# Patient Record
Sex: FEMALE | Race: AMERICAN INDIAN OR ALASKA NATIVE
[De-identification: names, ages, dates, MRNs, and addresses within clinical notes are randomized per-mention and may not be internally consistent; named-entity substitution may affect disease eponyms.]

---

## 2021-06-12 ENCOUNTER — HOSPITAL ENCOUNTER (INPATIENT)
Dept: HOSPITAL 5 - ED | Age: 36
LOS: 7 days | Discharge: HOME | DRG: 871 | End: 2021-06-19
Attending: INTERNAL MEDICINE | Admitting: INTERNAL MEDICINE
Payer: MEDICAID

## 2021-06-12 DIAGNOSIS — Z20.822: ICD-10-CM

## 2021-06-12 DIAGNOSIS — F10.20: ICD-10-CM

## 2021-06-12 DIAGNOSIS — I11.0: ICD-10-CM
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
## 2021-06-12 DIAGNOSIS — E66.9: ICD-10-CM

## 2021-06-12 DIAGNOSIS — I42.0: ICD-10-CM

## 2021-06-12 DIAGNOSIS — A41.9: Primary | ICD-10-CM

## 2021-06-12 DIAGNOSIS — I50.23: ICD-10-CM

## 2021-06-12 DIAGNOSIS — Z71.6: ICD-10-CM

## 2021-06-12 DIAGNOSIS — D50.9: ICD-10-CM

## 2021-06-12 DIAGNOSIS — E87.6: ICD-10-CM

## 2021-06-12 DIAGNOSIS — R79.1: ICD-10-CM

## 2021-06-12 DIAGNOSIS — J18.9: ICD-10-CM

## 2021-06-12 DIAGNOSIS — F17.210: ICD-10-CM

## 2021-06-12 DIAGNOSIS — J96.21: ICD-10-CM

## 2021-06-12 LAB
%HYPO/RBC NFR BLD AUTO: (no result) %
ANISOCYTOSIS BLD QL SMEAR: (no result)
APTT BLD: 25.1 SEC. (ref 24.2–36.6)
BAND NEUTROPHILS # (MANUAL): 0 K/MM3
BUN SERPL-MCNC: 8 MG/DL (ref 7–17)
BUN/CREAT SERPL: 11 %
CALCIUM SERPL-MCNC: 9.1 MG/DL (ref 8.4–10.2)
CRP SERPL-MCNC: 6.5 MG/DL (ref 0–1.3)
HCO3 BLDA-SCNC: 27.6 MMOL/L (ref 20–26)
HCT VFR BLD CALC: 36.1 % (ref 30.3–42.9)
HEMOLYSIS INDEX: 2
HGB BLD-MCNC: 11.5 GM/DL (ref 10.1–14.3)
INR PPP: 1.03 (ref 0.87–1.13)
MCHC RBC AUTO-ENTMCNC: 32 % (ref 30–34)
MCV RBC AUTO: 73 FL (ref 79–97)
MYELOCYTES # (MANUAL): 0 K/MM3
PCO2 BLDA: 41.9 MM HG
PH BLDA: 7.44 PH UNITS (ref 7.35–7.45)
PLATELET # BLD: 324 K/MM3 (ref 140–440)
PO2 BLDA: 196.3 MM HG (ref 80–90)
PROMYELOCYTES # (MANUAL): 0 K/MM3
RBC # BLD AUTO: 4.98 M/MM3 (ref 3.65–5.03)
TOTAL CELLS COUNTED BLD: 100

## 2021-06-12 PROCEDURE — 94660 CPAP INITIATION&MGMT: CPT

## 2021-06-12 PROCEDURE — 93970 EXTREMITY STUDY: CPT

## 2021-06-12 PROCEDURE — 99406 BEHAV CHNG SMOKING 3-10 MIN: CPT

## 2021-06-12 PROCEDURE — 85027 COMPLETE CBC AUTOMATED: CPT

## 2021-06-12 PROCEDURE — 93005 ELECTROCARDIOGRAM TRACING: CPT

## 2021-06-12 PROCEDURE — 83615 LACTATE (LD) (LDH) ENZYME: CPT

## 2021-06-12 PROCEDURE — 87400 INFLUENZA A/B EACH AG IA: CPT

## 2021-06-12 PROCEDURE — 84484 ASSAY OF TROPONIN QUANT: CPT

## 2021-06-12 PROCEDURE — 85025 COMPLETE CBC W/AUTO DIFF WBC: CPT

## 2021-06-12 PROCEDURE — 71275 CT ANGIOGRAPHY CHEST: CPT

## 2021-06-12 PROCEDURE — 82140 ASSAY OF AMMONIA: CPT

## 2021-06-12 PROCEDURE — 71045 X-RAY EXAM CHEST 1 VIEW: CPT

## 2021-06-12 PROCEDURE — 85730 THROMBOPLASTIN TIME PARTIAL: CPT

## 2021-06-12 PROCEDURE — 80053 COMPREHEN METABOLIC PANEL: CPT

## 2021-06-12 PROCEDURE — U0003 INFECTIOUS AGENT DETECTION BY NUCLEIC ACID (DNA OR RNA); SEVERE ACUTE RESPIRATORY SYNDROME CORONAVIRUS 2 (SARS-COV-2) (CORONAVIRUS DISEASE [COVID-19]), AMPLIFIED PROBE TECHNIQUE, MAKING USE OF HIGH THROUGHPUT TECHNOLOGIES AS DESCRIBED BY CMS-2020-01-R: HCPCS

## 2021-06-12 PROCEDURE — 80048 BASIC METABOLIC PNL TOTAL CA: CPT

## 2021-06-12 PROCEDURE — 84145 PROCALCITONIN (PCT): CPT

## 2021-06-12 PROCEDURE — 93306 TTE W/DOPPLER COMPLETE: CPT

## 2021-06-12 PROCEDURE — 82728 ASSAY OF FERRITIN: CPT

## 2021-06-12 PROCEDURE — 85610 PROTHROMBIN TIME: CPT

## 2021-06-12 PROCEDURE — 87040 BLOOD CULTURE FOR BACTERIA: CPT

## 2021-06-12 PROCEDURE — 85379 FIBRIN DEGRADATION QUANT: CPT

## 2021-06-12 PROCEDURE — 82803 BLOOD GASES ANY COMBINATION: CPT

## 2021-06-12 PROCEDURE — 86140 C-REACTIVE PROTEIN: CPT

## 2021-06-12 PROCEDURE — 84703 CHORIONIC GONADOTROPIN ASSAY: CPT

## 2021-06-12 PROCEDURE — 83735 ASSAY OF MAGNESIUM: CPT

## 2021-06-12 PROCEDURE — 36415 COLL VENOUS BLD VENIPUNCTURE: CPT

## 2021-06-12 PROCEDURE — 83880 ASSAY OF NATRIURETIC PEPTIDE: CPT

## 2021-06-12 PROCEDURE — 5A09357 ASSISTANCE WITH RESPIRATORY VENTILATION, LESS THAN 24 CONSECUTIVE HOURS, CONTINUOUS POSITIVE AIRWAY PRESSURE: ICD-10-PCS | Performed by: INTERNAL MEDICINE

## 2021-06-12 PROCEDURE — 80061 LIPID PANEL: CPT

## 2021-06-12 PROCEDURE — 85007 BL SMEAR W/DIFF WBC COUNT: CPT

## 2021-06-12 PROCEDURE — 82947 ASSAY GLUCOSE BLOOD QUANT: CPT

## 2021-06-12 PROCEDURE — 82962 GLUCOSE BLOOD TEST: CPT

## 2021-06-12 NOTE — EMERGENCY DEPARTMENT REPORT
ED Shortness of Breath HPI





- General


Chief Complaint: Dyspnea/Respdistress


Stated Complaint: RESP DISTRESS


Time Seen by Provider: 21 18:24


Source: patient, EMS


Mode of arrival: Stretcher


Limitations: Other





- History of Present Illness


Initial Comments: 





Patient is a 36-year-old F American female past medical history of hypertension 

and congestive heart failure with unknown EF who is presenting with respiratory 

distress.  Patient states last night she became more short of breath.  States 

she has been compliant with her medications.  Patient started having extreme 

respiratory distress this afternoon and paramedics were called.  Patient had O2 

sat in the upper 60s low 70s on their arrival.  Patient tripoding.  Patient 

placed on CPAP and given to nebs of albuterol.  Patient given 0.2 sublingual 

nitroglycerin as well.  She is denying any chest pain recent cough cold 

congestion fevers nausea vomiting or diarrhea at this time.  Patient has had 

several episodes with her congestive heart failure in the past.  Is the 

patient's first visit to our hospital no past medical history can be found in 

her records.








Patient has not been vaccinated for COVID-19





- Related Data


                                    Allergies











Allergy/AdvReac Type Severity Reaction Status Date / Time


 


No Known Allergies Allergy   Unverified 21 18:25














ED Review of Systems


ROS: 


Stated complaint: RESP DISTRESS


Other details as noted in HPI





Comment: All other systems reviewed and negative





ED Past Medical Hx





- Past Medical History


Hx Congestive Heart Failure: Yes





- Social History


Smoking Status: Never Smoker


Substance Use Type: Alcohol





ED Physical Exam





- General


Limitations: Other


General appearance: alert, in no apparent distress





- Head


Head exam: Present: atraumatic, normocephalic





- Eye


Eye exam: Present: normal appearance, PERRL, EOMI





- ENT


ENT exam: Present: mucous membranes moist





- Neck


Neck exam: Present: normal inspection





- Respiratory


Respiratory exam: Present: respiratory distress, wheezes, rales, accessory 

muscle use, decreased breath sounds.  Absent: normal lung sounds bilaterally, 

rhonchi, chest wall tenderness





- Cardiovascular


Cardiovascular Exam: Present: regular rate, normal rhythm, normal heart sounds. 

Absent: systolic murmur, diastolic murmur, rubs, gallop





- GI/Abdominal


GI/Abdominal exam: Present: soft, normal bowel sounds.  Absent: distended, 

tenderness, guarding, rebound





- Extremities Exam


Extremities exam: Present: normal inspection





- Back Exam


Back exam: Present: normal inspection





- Neurological Exam


Neurological exam: Present: alert, oriented X3





- Psychiatric


Psychiatric exam: Present: normal affect, normal mood





- Skin


Skin exam: Present: warm, dry, intact, normal color.  Absent: rash





ED Course


                                   Vital Signs











  21





  18:44 18:54 20:24


 


Temperature 97.6 F  


 


Pulse Rate 126 H 129 H 122 H


 


Respiratory 18  48 H





Rate   


 


Blood Pressure  117/75 139/85


 


Blood Pressure 133/75  





[l]   


 


O2 Sat by Pulse 100  100





Oximetry   














  21





  21:00


 


Temperature 


 


Pulse Rate 117 H


 


Respiratory 37 H





Rate 


 


Blood Pressure 


 


Blood Pressure 130/86





[l] 


 


O2 Sat by Pulse 100





Oximetry 














- Reevaluation(s)


Reevaluation #1: 





21 20:24


Patient's x-ray shows cardiomegaly with pulmonary vascular congestion and 

infiltrate that was interpreted as possible pneumonia in the right middle lobe. 

 Left side is obscured by the cardiac silhouette.  Patient does have a white 

count of 16 but she also has an elevated BNP.  At this point the differential 

includes congestive heart failure exacerbation which is what the patient 

believes she was presenting with but also includes pneumonia from a bacterial 

source as well as COVID-19.  Pulmonary embolus is also in the differential as 

well.  Because the chest x-ray is nonconclusive and the patient was extremely 

hypoxic before placed on supplemental oxygen CT of the chest will be performed. 

 CT angio to rule out pulmonary embolus and will also be able to better 

delineate the lung markings present on the chest x-ray.  Patient is doing well 

on BiPAP at this time.  We have added antibiotics blood cultures and lactic to 

the laboratory studies.  Patient given Rocephin and azithromycin.  Patient is 

diuresing after Lasix was given.


21 20:26








ED Medical Decision Making





- Lab Data


Result diagrams: 


                                 21 18:45





                                 21 20:16





- EKG Data


-: EKG Interpreted by Me





- EKG Data





21 18:47


EKG shows sinus rhythm with a rate of 130.  Possible Q waves in anterior leads. 

 No ST segment elevations or depressions.  Nonspecific T wave flattening.  Time 

interpretation 3435





- Radiology Data





[facility]  


                                     [address] [place] [address]  


 


                                            XRay Report   


                                               Signed  


 


Patient: JOLENE PATRICK                                                      

          MR#:   


[id number]          


: 1985                                                                

Acct:[id number]      


 


Age/Sex: 36 / F                                                                

ADM Date: 21     


 


Loc: ED       


Attending Dr:   


 


 


Ordering Physician: VALDO MIGUEL MD  


Date of Service: 21  


Procedure(s): XR chest 1V ap  


Accession Number(s): [id number]  


 


cc: VALDO MIGUEL MD   


 


Fluoro Time In Minutes:   


 


CHEST 1 VIEW   


 


 INDICATION / CLINICAL INFORMATION:  


 resp distress.  


 


 COMPARISON:   


 None available.  


 


 FINDINGS:  


 


 SUPPORT DEVICES: None.  


 


 HEART / MEDIASTINUM: Mild cardiomegaly.   


 


 LUNGS / PLEURA: There is airspace opacity within the right lower lobe. The left

 lung is clear. No 


large pleural effusion. No pneumothorax.   


 


 ADDITIONAL FINDINGS: No significant additional findings.  


 


 IMPRESSION:  


 1. Right lower lobe consolidative airspace opacity worrisome for pneumonia.  


 2. Mild cardiomegaly.  


 


 Signer Name: Roselia Alonso MD   


 Signed: 2021 7:54 PM  


 Workstation Name: [facility] [address]  


 


                                          Cat Scan Report   


                                               Signed  


 


Patient: JOLENE PATRICK                                                      

          MR#:   


[id number]          


: 1985                                                                

Acct:[id number]      


 


Age/Sex: 36 / F                                                                

ADM Date: 21     


 


Loc: ED       


Attending Dr:   


 


 


Ordering Physician: VALDO MIGUEL MD  


Date of Service: 21  


Procedure(s): CT angio chest  


Accession Number(s): [id number]  


 


cc: VALDO MIGUEL MD   


 


 


CTA CHEST WITH IV CONTRAST  


 


 INDICATION / CLINICAL INFORMATION:  


 hypoxia.  


 


 TECHNIQUE:  


 Axial CT images were obtained through the chest after injection of 100 mL 

Omnipaque 350 IV 


contrast. 3 plane MIP and/or 3D reconstructions were produced. All CT scans at 

this location are 


performed using CT dose reduction for ALARA by means of automated exposure 

control.   


 


 COMPARISON:  


 Chest radiograph same day  


 


 FINDINGS:  


 PULMONARY ARTERIES: Evaluation of the subsegmental pulmonary arteries is 

degraded by streak 


artifact. No large central or segmental pulmonary embolus identified.  


 THORACIC AORTA: No significant abnormality.   


 HEART: Cardiomegaly.  


 CORONARY ARTERIES: No significant calcification.  


 PLEURA: No pleural effusion. No pneumothorax.  


 LYMPH NODES: No significant adenopathy.  


 LUNGS: There are moderate patchy bilateral airspace opacities with a mid to 

lower lung zone 


predominance.   


 


 ADDITIONAL FINDINGS: None.  


 


 UPPER ABDOMEN: No acute findings.  


 


 SKELETAL STRUCTURES: No significant osseous abnormality.  


 


 IMPRESSION:  


 1. Evaluation of the subsegmental pulmonary arteries is degraded by streak cassie

fact. No large 


central or segmental pulmonary embolus identified.  


 2. Moderate patchy bilateral airspace opacities with a mid to lower lung zone 

predominance. 


Findings are worrisome for multifocal infection, with atypical or viral 

etiologies possible.   


 3. Cardiomegaly.  


 


 Signer Name: Roselia Alonso MD   


 Signed: 2021 9:51 PM  


 Workstation Name: [id number]   


 








- Medical Decision Making





Patient is continue to require oxygen.  She is currently on BiPAP at the time of

 admission.  No PE was found on CTA a.  Patient appears to have bilateral 

infiltrates most consistent with COVID-19 pneumonia.  Patient does have 

increased and her inflammatory markers especially her CRP.  Dose of Decadron was

 given.  Also treat the patient for bacterial pneumonia as well.  Patient will 

be admitted to the hospitalist service.  COVID-19 test will be performed in the 

morning.


Critical Care Time: Yes (45)


Critical care attestation.: 


If time is entered above; I have spent that time in minutes in the direct care 

of this critically ill patient, excluding procedure time.








ED Disposition


Clinical Impression: 


 Hypoxia, Suspected COVID-19 virus infection





Pneumonia


Qualifiers:


 Pneumonia type: due to unspecified organism Laterality: bilateral Lung 

location: unspecified part of lung Qualified Code(s): J18.9 - Pneumonia, 

unspecified organism





Congestive heart failure (CHF)


Qualifiers:


 Heart failure chronicity: chronic 





Disposition: 09 OP ADMIT IP TO THIS HOSP


Is pt being admited?: Yes


Does the pt Need Aspirin: No


Condition: Stable


Instructions:  Bacterial Pneumonia (ED)


Time of Disposition: 22:35

## 2021-06-12 NOTE — XRAY REPORT
CHEST 1 VIEW 



INDICATION / CLINICAL INFORMATION:

resp distress.



COMPARISON: 

None available.



FINDINGS:



SUPPORT DEVICES: None.



HEART / MEDIASTINUM: Mild cardiomegaly. 



LUNGS / PLEURA: There is airspace opacity within the right lower lobe. The left lung is clear. No lar
ge pleural effusion. No pneumothorax. 



ADDITIONAL FINDINGS: No significant additional findings.



IMPRESSION:

1. Right lower lobe consolidative airspace opacity worrisome for pneumonia.

2. Mild cardiomegaly.



Signer Name: Roselia Alonso MD 

Signed: 6/12/2021 7:54 PM

Workstation Name: [id number]

## 2021-06-12 NOTE — HISTORY AND PHYSICAL REPORT
History of Present Illness


Date of examination: 06/12/21


Date of admission: 


06/12/21 22:35





Chief complaint: 





Shortness of Breath


History of present illness: 





36-year-old -American female with significant past medical history of 

hypertension and CHF-unknown EF presenting the emergency room today complaining 

of shortness of breath.  Shortness of breath was said to have started last night

and she went into more severe respiratory distress earlier today and therefore 

called the EMS.  She has been compliant with her medications and upon arrival of

EMS oxygen saturation was between the 60s and 70s.  She had nebulizing 

treatments with albuterol and also subsequently placed on CPAP.  She denies any 

chest pain.  She has had some mild cough which is nonproductive.  Denies any 

fever or chills, no nausea vomiting and no diarrhea.





Patient denies any sick contacts and no recent travel.  She denies any contact 

with anyone with COVID-19.


She has not been vaccinated against COVID-19.





Work-up in the emergency room today reveals cardiomegaly, right lower lobe co

nsolidative airspace opacity worrisome for pneumonia on the chest x-ray.





CT angiogram of the chest reveals moderate patchy bilateral airspace opacities 

with a mid to lower lung zone predominance.  Findings worrisome for multifocal 

infection with atypical or viral etiology is possible.


Labs were significant for mild hypokalemia of 3.2..





Patient is being admitted with respiratory failure possibly secondary to 

pneumonia.  We will also rule out COVID-19.





Past History


Past Medical History: heart failure, hypertension


Past Surgical History: denies: No surgical history


Social history: smoking (Current daily smoker- 1/2 pack per day.), alcohol abuse


Family history: no significant family history





Medications and Allergies


                                    Allergies











Allergy/AdvReac Type Severity Reaction Status Date / Time


 


No Known Allergies Allergy   Unverified 06/12/21 18:25











Active Meds: 


Active Medications





Enoxaparin Sodium (Enoxaparin 40 Mg/0.4 Ml Inj)  40 mg SUB-Q QDAY@2200 ARMIDA; 

Protocol


Ceftriaxone Sodium (Rocephin/Ns 2 Gm/100 Ml)  2 gm in 100 mls @ 200 mls/hr IV 

Q24H ARMIDA; Protocol


Azithromycin (Zithromax/Ns)  500 mg in 250 mls @ 250 mls/hr IV Q24H ARMIDA; 

Protocol


Magnesium Hydroxide (Magnesium Hydroxide (Mom) Oral Liqd Udc)  30 ml PO Q4H PRN


   PRN Reason: Constipation


Morphine Sulfate (Morphine 2 Mg/1 Ml Inj)  2 mg IV Q4H PRN


   PRN Reason: Pain, Moderate (4-6)


Ondansetron HCl (Ondansetron 4 Mg/2 Ml Inj)  4 mg IV Q8H PRN


   PRN Reason: Nausea And Vomiting


Sodium Chloride (Sodium Chloride 0.9% 10 Ml Flush Syringe)  10 ml IV BID ARMIDA


Sodium Chloride (Sodium Chloride 0.9% 10 Ml Flush Syringe)  10 ml IV PRN PRN


   PRN Reason: LINE FLUSH











Review of Systems


Constitutional: no fever, no chills


Ears, nose, mouth and throat: no nasal congestion, no sore throat


Cardiovascular: no chest pain, no palpitations


Respiratory: cough, shortness of breath


Gastrointestinal: no abdominal pain, no nausea, no vomiting, no diarrhea


Genitourinary Female: no pelvic pain, no flank pain, no dysuria, no hematuria


Musculoskeletal: no neck pain, no low back pain


Integumentary: no rash, no pruritis


Neurological: no headaches, no confusion


Psychiatric: no anxiety, no depression


Endocrine: no polyphagia, no polydipsia, no polyuria





Exam





- Constitutional


Vitals: 


                                        











Temp Pulse Resp BP Pulse Ox


 


 97.6 F   116 H  46 H  150/89   88 


 


 06/12/21 18:44  06/12/21 23:00  06/12/21 23:00  06/12/21 23:00  06/12/21 23:00











General appearance: Present: mild distress, obese





- EENT


Eyes: Present: PERRL, EOM intact.  Absent: scleral icterus


ENT: hearing intact, clear oral mucosa, dentition normal





- Neck


Neck: Present: supple, normal ROM





- Respiratory


Respiratory effort: normal


Respiratory: bilateral: diminished (In the bases)





- Cardiovascular


Rhythm: regular


Heart Sounds: Present: S1 & S2.  Absent: gallop, systolic murmur, diastolic 

murmur, rub, click





- Extremities


Extremities: no ischemia, pulses intact, pulses symmetrical, No edema, normal 

temperature, normal color, Full ROM


Peripheral Pulses: within normal limits





- Abdominal


General gastrointestinal: Present: soft, non-tender, non-distended, normal bowel

sounds.  Absent: mass





- Integumentary


Integumentary: Present: clear, warm, dry, normal turgor.  Absent: rash





- Musculoskeletal


Musculoskeletal: strength equal bilaterally





- Psychiatric


Psychiatric: appropriate mood/affect, intact judgment & insight, memory intact, 

cooperative





- Neurologic


Neurologic: CNII-XII intact, no focal deficits, moves all extremities





HEART Score





- HEART Score


Troponin: 


                                        











Troponin T  < 0.010 ng/mL (0.00-0.029)   06/12/21  18:45    














Results





- Labs


CBC & Chem 7: 


                                 06/12/21 18:45





                                 06/12/21 20:16


Labs: 


                              Abnormal lab results











  06/12/21 06/12/21 06/12/21 Range/Units





  18:45 18:45 18:56 


 


WBC  16.3 H    (4.5-11.0)  K/mm3


 


MCV  73 L    (79-97)  fl


 


MCH  23 L    (28-32)  pg


 


RDW  19.2 H    (13.2-15.2)  %


 


Seg Neuts % (Manual)  94.0 H    (40.0-70.0)  %


 


Lymphocytes % (Manual)  2.0 L    (13.4-35.0)  %


 


Seg Neutrophils # Man  15.3 H    (1.8-7.7)  K/mm3


 


Lymphocytes # (Manual)  0.3 L    (1.2-5.4)  K/mm3


 


ABG pO2    196.3 H  (80.0-90.0)  mm Hg


 


ABG HCO3    27.6 H  (20.0-26.0)  mmol/L


 


ABG O2 Saturation    99.2 H  (95.0-99.0)  %


 


ABG Base Excess    3.1 H  (-2.0-3.0)  mmol/L


 


ABG Hemoglobin    11.8 L  (12.0-16.0)  gm/dl


 


Potassium   3.2 L   (3.6-5.0)  mmol/L


 


Glucose   108 H   ()  mg/dL


 


Lactate Dehydrogenase     ()  units/L


 


C-Reactive Protein     (0.00-1.30)  mg/dL


 


NT-Pro-B Natriuret Pep   1203 H   (0-450)  pg/mL














  06/12/21 Range/Units





  20:16 


 


WBC   (4.5-11.0)  K/mm3


 


MCV   (79-97)  fl


 


MCH   (28-32)  pg


 


RDW   (13.2-15.2)  %


 


Seg Neuts % (Manual)   (40.0-70.0)  %


 


Lymphocytes % (Manual)   (13.4-35.0)  %


 


Seg Neutrophils # Man   (1.8-7.7)  K/mm3


 


Lymphocytes # (Manual)   (1.2-5.4)  K/mm3


 


ABG pO2   (80.0-90.0)  mm Hg


 


ABG HCO3   (20.0-26.0)  mmol/L


 


ABG O2 Saturation   (95.0-99.0)  %


 


ABG Base Excess   (-2.0-3.0)  mmol/L


 


ABG Hemoglobin   (12.0-16.0)  gm/dl


 


Potassium   (3.6-5.0)  mmol/L


 


Glucose  112 H  ()  mg/dL


 


Lactate Dehydrogenase  256 H  ()  units/L


 


C-Reactive Protein  6.50 H  (0.00-1.30)  mg/dL


 


NT-Pro-B Natriuret Pep   (0-450)  pg/mL














Assessment and Plan





- Patient Problems


(1) Pneumonia


Current Visit: Yes   Status: Acute   


Qualifiers: 


   Pneumonia type: due to unspecified organism   Laterality: bilateral   Lung 

location: unspecified part of lung   Qualified Code(s): J18.9 - Pneumonia, 

unspecified organism   


Plan to address problem: 


Patient placed on empiric IV antibiotics.  We will await culture results.








(2) Congestive heart failure (CHF)


Current Visit: Yes   Status: Acute   


Qualifiers: 


   Heart failure chronicity: chronic 


Plan to address problem: 


Patient has known history of CHF.  She indicates she has been compliant with her

medications.


We will resume routine home medications once reconciled.


We will also schedule patient for echocardiogram during this admission.








(3) Hypoxia


Current Visit: Yes   Status: Acute   


Plan to address problem: 


Possibly secondary to the pneumonia.


She also has underlying history of CHF.


Patient currently on BiPAP


We will keep O2 saturation greater or equal to 94%.


Consult placed to pulmonology for evaluation.








(4) Suspected COVID-19 virus infection


Current Visit: Yes   Status: Acute   


Plan to address problem: 


We await COVID-19 testing.


Consult placed to infectious disease for evaluation and recommendation.








(5) Hypokalemia


Current Visit: Yes   Status: Acute   


Plan to address problem: 


Potassium will be repleted.  Will monitor chemistry.








(6) DVT prophylaxis


Current Visit: Yes   Status: Acute   


Plan to address problem: 


Patient placed on subcutaneous Lovenox.








(7) Full code status


Current Visit: Yes   Status: Acute   


Plan to address problem: 


Patient is full code.

## 2021-06-13 LAB
BAND NEUTROPHILS # (MANUAL): 0.2 K/MM3
BUN SERPL-MCNC: 9 MG/DL (ref 7–17)
BUN/CREAT SERPL: 11 %
CALCIUM SERPL-MCNC: 9.3 MG/DL (ref 8.4–10.2)
CRP SERPL-MCNC: 5.5 MG/DL (ref 0–1.3)
HCT VFR BLD CALC: 36.1 % (ref 30.3–42.9)
HEMOLYSIS INDEX: 5
HGB BLD-MCNC: 11.6 GM/DL (ref 10.1–14.3)
INR PPP: 1.05 (ref 0.87–1.13)
MCHC RBC AUTO-ENTMCNC: 32 % (ref 30–34)
MCV RBC AUTO: 73 FL (ref 79–97)
MYELOCYTES # (MANUAL): 0 K/MM3
PLATELET # BLD: 335 K/MM3 (ref 140–440)
PROMYELOCYTES # (MANUAL): 0 K/MM3
RBC # BLD AUTO: 4.97 M/MM3 (ref 3.65–5.03)
TOTAL CELLS COUNTED BLD: 100

## 2021-06-13 RX ADMIN — ENOXAPARIN SODIUM SCH MG: 100 INJECTION SUBCUTANEOUS at 22:22

## 2021-06-13 RX ADMIN — CEFTRIAXONE SODIUM SCH MLS/HR: 2 INJECTION, POWDER, FOR SOLUTION INTRAMUSCULAR; INTRAVENOUS at 10:09

## 2021-06-13 RX ADMIN — Medication SCH ML: at 10:10

## 2021-06-13 RX ADMIN — MORPHINE SULFATE PRN MG: 2 INJECTION, SOLUTION INTRAMUSCULAR; INTRAVENOUS at 22:29

## 2021-06-13 RX ADMIN — Medication SCH ML: at 22:22

## 2021-06-13 NOTE — CONSULTATION
History of Present Illness





- Reason for Consult


Consult date: 06/13/21


pneumonia, ?COVID-19


Requesting physician: SCAR PLASENCIA





- History of Present Illness





The patient is a 36-year-old female with hypertension, CHF admitted to the 

hospital with shortness of breath, noted to be hypoxic.  She was also having 

some nonproductive cough.  She has not been vaccinated.  Upon evaluation in the 

ER, found to have chest x-ray findings of pneumonia.  CTA showed bilateral 

patchy airspace opacities suggestive of viral pneumonia.  No PE was noted.  

COVID-19 PCR was ordered and is pending.  Infectious diseases was consulted for 

additional evaluation.  Afebrile here.  Currently on Venturi mask.





Labs showed leukocytosis, proBNP 1203, CRP 6.5, ferritin 72, procalcitonin 0.05





Review of Systems: 


reviewed in the chart, unable to obtain, minimize risk of transmission





Past History


Past Medical History: heart failure, hypertension


Past Surgical History: denies: No surgical history


Social history: smoking (Current daily smoker- 1/2 pack per day.), alcohol abuse


Family history: no significant family history





Medications and Allergies


                                    Allergies











Allergy/AdvReac Type Severity Reaction Status Date / Time


 


No Known Allergies Allergy   Unverified 06/12/21 18:25











Active Meds: 


Active Medications





Enoxaparin Sodium (Enoxaparin 40 Mg/0.4 Ml Inj)  40 mg SUB-Q QDAY@2200 [facility]; 

Protocol


Ceftriaxone Sodium (Rocephin/Ns 2 Gm/100 Ml)  2 gm in 100 mls @ 200 mls/hr IV 

Q24HR [facility]; Protocol


   Last Admin: 06/13/21 10:09 Dose:  200 mls/hr


   Documented by: 


Azithromycin (Zithromax/Ns)  500 mg in 250 mls @ 250 mls/hr IV Q24HR [facility]; 

Protocol


   Last Admin: 06/13/21 10:09 Dose:  250 mls/hr


   Documented by: 


Magnesium Hydroxide (Magnesium Hydroxide (Mom) Oral Liqd Udc)  30 ml PO Q4H PRN


   PRN Reason: Constipation


Morphine Sulfate (Morphine 2 Mg/1 Ml Inj)  2 mg IV Q4H PRN


   PRN Reason: Pain, Moderate (4-6)


Ondansetron HCl (Ondansetron 4 Mg/2 Ml Inj)  4 mg IV Q8H PRN


   PRN Reason: Nausea And Vomiting


Sodium Chloride (Sodium Chloride 0.9% 10 Ml Flush Syringe)  10 ml IV BID [facility]


   Last Admin: 06/13/21 10:10 Dose:  10 ml


   Documented by: 


Sodium Chloride (Sodium Chloride 0.9% 10 Ml Flush Syringe)  10 ml IV PRN PRN


   PRN Reason: LINE FLUSH











Physical Examination





- Physical Exam


Narrative exam: 


Physical Exam (reviewed in chart to minimize risk of transmission)


Constitutional: deferred


Head, Ears, Nose: deferred


Eyes: deferred


Neck: deferred


Oral: deferred


Cardiovascular: deferred


Respiratory: deferred


GI: deferred


Musculoskeletal: deferred


Skin: deferred


Hem/Lymphatic: deferred


Psych: deferred


Neurological: deferred








- Constitutional


Vitals: 


                                   Vital Signs











Temp Pulse Resp BP Pulse Ox


 


 98.1 F   113 H  22   161/96   94 


 


 06/13/21 08:46  06/13/21 10:00  06/13/21 10:00  06/13/21 10:00  06/13/21 10:15








                           Temperature -Last 24 Hours











Temperature                    98.1 F


 


Temperature                    97.6 F

















Results





- Labs


CBC & Chem 7: 


                                 06/13/21 06:17





                                 06/13/21 06:17


Labs: 


                              Abnormal lab results











  06/12/21 06/12/21 06/12/21 Range/Units





  18:45 18:45 18:56 


 


WBC  16.3 H    (4.5-11.0)  K/mm3


 


MCV  73 L    (79-97)  fl


 


MCH  23 L    (28-32)  pg


 


RDW  19.2 H    (13.2-15.2)  %


 


Seg Neuts % (Manual)  94.0 H    (40.0-70.0)  %


 


Lymphocytes % (Manual)  2.0 L    (13.4-35.0)  %


 


Seg Neutrophils # Man  15.3 H    (1.8-7.7)  K/mm3


 


Lymphocytes # (Manual)  0.3 L    (1.2-5.4)  K/mm3


 


ABG pO2    196.3 H  (80.0-90.0)  mm Hg


 


ABG HCO3    27.6 H  (20.0-26.0)  mmol/L


 


ABG O2 Saturation    99.2 H  (95.0-99.0)  %


 


ABG Base Excess    3.1 H  (-2.0-3.0)  mmol/L


 


ABG Hemoglobin    11.8 L  (12.0-16.0)  gm/dl


 


Potassium   3.2 L   (3.6-5.0)  mmol/L


 


Glucose   108 H   ()  mg/dL


 


Lactate Dehydrogenase     ()  units/L


 


C-Reactive Protein     (0.00-1.30)  mg/dL


 


NT-Pro-B Natriuret Pep   1203 H   (0-450)  pg/mL














  06/12/21 06/13/21 06/13/21 Range/Units





  20:16 06:17 06:17 


 


WBC   21.3 H   (4.5-11.0)  K/mm3


 


MCV   73 L   (79-97)  fl


 


MCH   23 L   (28-32)  pg


 


RDW   19.5 H   (13.2-15.2)  %


 


Seg Neuts % (Manual)   96.0 H   (40.0-70.0)  %


 


Lymphocytes % (Manual)   1.0 L   (13.4-35.0)  %


 


Seg Neutrophils # Man   20.4 H   (1.8-7.7)  K/mm3


 


Lymphocytes # (Manual)   0.2 L   (1.2-5.4)  K/mm3


 


ABG pO2     (80.0-90.0)  mm Hg


 


ABG HCO3     (20.0-26.0)  mmol/L


 


ABG O2 Saturation     (95.0-99.0)  %


 


ABG Base Excess     (-2.0-3.0)  mmol/L


 


ABG Hemoglobin     (12.0-16.0)  gm/dl


 


Potassium     (3.6-5.0)  mmol/L


 


Glucose  112 H   129 H  ()  mg/dL


 


Lactate Dehydrogenase  256 H    ()  units/L


 


C-Reactive Protein  6.50 H    (0.00-1.30)  mg/dL


 


NT-Pro-B Natriuret Pep     (0-450)  pg/mL














- Imaging and Cardiology


Chest x-ray: report reviewed, image reviewed


CT scan - chest: report reviewed, image reviewed (b/l airspace opacities)





Assessment and Plan





Cultures:


SARS CoV2 PCR: Pending


6/12/2021 blood culture: In process





A/P:


36-year-old female with hypertension, CHF admitted to the hospital with 

shortness of breath, noted to be hypoxic.  She was also having some 

nonproductive cough.  She has not been vaccinated:





#SIRS/Sepsis: secondary to below.





#Bilateral pneumonia with acute hypoxic respiratory failure : Possibly viral in 

etiology however there was leukocytosis on admission.  Procalcitonin is low.  

Also possibly a component of CHF.





Recs:


-Follow-up COVID-19 PCR, if negative, check for influenza and RSV


-If COVID-19 is positive, start decadron and remdesivir


-Continue empiric antibiotics for now








Jen Gramajo MD, FACP


[facility] ([facility])


O: [phone]


F: [phone]

## 2021-06-13 NOTE — CONSULTATION
History of Present Illness


Consult date: 06/13/21


Requesting physician: ALYCIA THAKUR


Reason for consult: dyspnea


History of present illness: 





37 yo with hx of CHF admitted with increasing SOB in absence of chest pain, 

fevers, chills, cough, sputum, hemoptysis, or wheezing.





Active Medications





Enoxaparin Sodium (Enoxaparin 40 Mg/0.4 Ml Inj)  40 mg SUB-Q QDAY@2200 [facility]; 

Protocol


Hydralazine HCl (Hydralazine 20 Mg/1 Ml Inj)  10 mg IV Q4HR PRN


   PRN Reason: SBP > 170 or DBP > 100


Ceftriaxone Sodium (Rocephin/Ns 2 Gm/100 Ml)  2 gm in 100 mls @ 200 mls/hr IV 

Q24HR [facility]; Protocol


   Last Admin: 06/13/21 10:09 Dose:  200 mls/hr


   Documented by: 


Azithromycin (Zithromax/Ns)  500 mg in 250 mls @ 250 mls/hr IV Q24HR [facility]; 

Protocol


   Last Admin: 06/13/21 10:09 Dose:  250 mls/hr


   Documented by: 


Magnesium Hydroxide (Magnesium Hydroxide (Mom) Oral Liqd Udc)  30 ml PO Q4H PRN


   PRN Reason: Constipation


Morphine Sulfate (Morphine 2 Mg/1 Ml Inj)  2 mg IV Q4H PRN


   PRN Reason: Pain, Moderate (4-6)


Ondansetron HCl (Ondansetron 4 Mg/2 Ml Inj)  4 mg IV Q8H PRN


   PRN Reason: Nausea And Vomiting


Sodium Chloride (Sodium Chloride 0.9% 10 Ml Flush Syringe)  10 ml IV BID [facility]


   Last Admin: 06/13/21 10:10 Dose:  10 ml


   Documented by: 


Sodium Chloride (Sodium Chloride 0.9% 10 Ml Flush Syringe)  10 ml IV PRN PRN


   PRN Reason: LINE FLUSH











Past History


Past Medical History: heart failure, hypertension


Past Surgical History: denies: No surgical history


Social history: smoking (Current daily smoker- 1/2 pack per day.), alcohol 

abuse, full code.  denies: prescription drug abuse, IV drug use


Family history: no significant family history (no pulm issues reported)





Medications and Allergies


                                    Allergies











Allergy/AdvReac Type Severity Reaction Status Date / Time


 


No Known Allergies Allergy   Unverified 06/12/21 18:25











Active Meds: 


Active Medications





Enoxaparin Sodium (Enoxaparin 40 Mg/0.4 Ml Inj)  40 mg SUB-Q QDAY@2200 [facility]; 

Protocol


Ceftriaxone Sodium (Rocephin/Ns 2 Gm/100 Ml)  2 gm in 100 mls @ 200 mls/hr IV 

Q24HR [facility]; Protocol


   Last Admin: 06/13/21 10:09 Dose:  200 mls/hr


   Documented by: 


Azithromycin (Zithromax/Ns)  500 mg in 250 mls @ 250 mls/hr IV Q24HR [facility]; Khai

col


   Last Admin: 06/13/21 10:09 Dose:  250 mls/hr


   Documented by: 


Magnesium Hydroxide (Magnesium Hydroxide (Mom) Oral Liqd Udc)  30 ml PO Q4H PRN


   PRN Reason: Constipation


Morphine Sulfate (Morphine 2 Mg/1 Ml Inj)  2 mg IV Q4H PRN


   PRN Reason: Pain, Moderate (4-6)


Ondansetron HCl (Ondansetron 4 Mg/2 Ml Inj)  4 mg IV Q8H PRN


   PRN Reason: Nausea And Vomiting


Sodium Chloride (Sodium Chloride 0.9% 10 Ml Flush Syringe)  10 ml IV BID [facility]


   Last Admin: 06/13/21 10:10 Dose:  10 ml


   Documented by: 


Sodium Chloride (Sodium Chloride 0.9% 10 Ml Flush Syringe)  10 ml IV PRN PRN


   PRN Reason: LINE FLUSH











Review of Systems


All systems: negative





Physical Examination


Vital signs: 


                                   Vital Signs











Temp Pulse Resp BP Pulse Ox


 


 97.6 F   126 H  18   133/75   100 


 


 06/12/21 18:44  06/12/21 18:44  06/12/21 18:44  06/12/21 18:44  06/12/21 18:44











General appearance: no acute distress, alert


Eyes: non-icteric


ENT: oropharynx moist


Neck: supple


Effort: normal


Ascultation: Bilateral: clear (anteriorly)


Cardiovascular: other (sinus tachy, no mrg)


Gastrointestinal: normoactive bowel sounds, soft, non-tender, non-distended


Integumentary: normal


Extremities: no cyanosis, no edema, pink and warm


normal mental status, non-focal exam, pupils equal and round


mood appropriate, affect normal





Results





- Laboratory Findings


CBC and BMP: 


                                 06/13/21 06:17





                                 06/13/21 06:17


ABG











ABG pH  7.436 pH Units (7.350-7.450)   06/12/21  18:56    


 


ABG pCO2  41.9 mm Hg  06/12/21  18:56    


 


ABG pO2  196.3 mm Hg (80.0-90.0)  H  06/12/21  18:56    


 


ABG O2 Saturation  99.2 % (95.0-99.0)  H  06/12/21  18:56    





PT/INR, D-dimer











PT  14.3 Sec. (12.2-14.9)   06/13/21  06:17    


 


INR  1.05  (0.87-1.13)   06/13/21  06:17    


 


D-Dimer  167.40 ng/mlDDU (0-234)   06/12/21  20:16    








Abnormal lab findings: 


                                  Abnormal Labs











  06/12/21 06/12/21 06/12/21





  18:45 18:45 18:56


 


WBC  16.3 H  


 


MCV  73 L  


 


MCH  23 L  


 


RDW  19.2 H  


 


Seg Neuts % (Manual)  94.0 H  


 


Lymphocytes % (Manual)  2.0 L  


 


Seg Neutrophils # Man  15.3 H  


 


Lymphocytes # (Manual)  0.3 L  


 


ABG pO2    196.3 H


 


ABG HCO3    27.6 H


 


ABG O2 Saturation    99.2 H


 


ABG Base Excess    3.1 H


 


ABG Hemoglobin    11.8 L


 


Potassium   3.2 L 


 


Glucose   108 H 


 


Lactate Dehydrogenase   


 


C-Reactive Protein   


 


NT-Pro-B Natriuret Pep   1203 H 














  06/12/21 06/13/21 06/13/21





  20:16 06:17 06:17


 


WBC   21.3 H 


 


MCV   73 L 


 


MCH   23 L 


 


RDW   19.5 H 


 


Seg Neuts % (Manual)   96.0 H 


 


Lymphocytes % (Manual)   1.0 L 


 


Seg Neutrophils # Man   20.4 H 


 


Lymphocytes # (Manual)   0.2 L 


 


ABG pO2   


 


ABG HCO3   


 


ABG O2 Saturation   


 


ABG Base Excess   


 


ABG Hemoglobin   


 


Potassium   


 


Glucose  112 H   129 H


 


Lactate Dehydrogenase  256 H  


 


C-Reactive Protein  6.50 H  


 


NT-Pro-B Natriuret Pep   














- Diagnostic Findings


Chest x-ray: report reviewed, image reviewed


CT scan - chest: report reviewed, image reviewed





Assessment and Plan


Imp:


1. Pneumonia +/- A/C CHF


2. Acute respiratory failure, hypoxia


3. Obesity


4. Chronic nicotine dependence, cigarettes


5. Chronic EtOH dependence


6. HTN





Rec:


1. Check UDS; current tachycardia and elevated BP may be related to EtOH 

withdrawal; add PRN Hydralazine and consider CIWA protocol


2. F/u Echo; consider cardiology eval.


3. Agree w/ empiric ABX for pneumonia though infiltrates may be related to pulm 

edema; Covid-19 is negative


4. Needs to d/c all EtOH and smoking


5. Further plans pending clinical course





Thanks for the consult. Will follow w/ you. Plan of care reviewed w/ patient, 

she understands/agrees.

## 2021-06-14 PROCEDURE — 5A09357 ASSISTANCE WITH RESPIRATORY VENTILATION, LESS THAN 24 CONSECUTIVE HOURS, CONTINUOUS POSITIVE AIRWAY PRESSURE: ICD-10-PCS | Performed by: INTERNAL MEDICINE

## 2021-06-14 RX ADMIN — MORPHINE SULFATE PRN MG: 2 INJECTION, SOLUTION INTRAMUSCULAR; INTRAVENOUS at 21:30

## 2021-06-14 RX ADMIN — Medication SCH ML: at 21:23

## 2021-06-14 RX ADMIN — AZITHROMYCIN SCH MG: 250 TABLET, FILM COATED ORAL at 09:46

## 2021-06-14 RX ADMIN — Medication SCH ML: at 09:47

## 2021-06-14 RX ADMIN — MORPHINE SULFATE PRN MG: 2 INJECTION, SOLUTION INTRAMUSCULAR; INTRAVENOUS at 11:33

## 2021-06-14 RX ADMIN — ENOXAPARIN SODIUM SCH MG: 100 INJECTION SUBCUTANEOUS at 21:23

## 2021-06-14 RX ADMIN — CEFTRIAXONE SODIUM SCH MLS/HR: 2 INJECTION, POWDER, FOR SOLUTION INTRAMUSCULAR; INTRAVENOUS at 09:46

## 2021-06-14 NOTE — ELECTROCARDIOGRAPH REPORT
[facility]

                                       

Test Date:    2021               Test Time:    18:39:24

Pat Name:     JOLENE PATRICK        Department:   

Patient ID:   [id number]          Room:         A267 1

Gender:       F                        Technician:   mitzi

:          1985               Requested By: VALDO MIGUEL

Order Number: [id number]              [place] MD:   Farrah Miller

                                 Measurements

Intervals                              Axis          

Rate:         130                      P:            148

MA:           140                      QRS:          7

QRSD:         97                       T:            

QT:                                                  

QTc:          0                                      

                           Interpretive Statements

Sinus or ectopic atrial tachycardia

Left atrial enlargement

Anteroseptal infarct, old

Nonspecific repol abnormality, lateral leads

No previous ECG available for comparison

Electronically Signed On 2021 15:00:00 EDT by Farrah Miller

## 2021-06-15 LAB
BUN SERPL-MCNC: 12 MG/DL (ref 7–17)
BUN/CREAT SERPL: 17 %
CALCIUM SERPL-MCNC: 8.9 MG/DL (ref 8.4–10.2)
HCT VFR BLD CALC: 32.4 % (ref 30.3–42.9)
HEMOLYSIS INDEX: 0
HGB BLD-MCNC: 10.4 GM/DL (ref 10.1–14.3)
MCHC RBC AUTO-ENTMCNC: 32 % (ref 30–34)
MCV RBC AUTO: 73 FL (ref 79–97)
PLATELET # BLD: 298 K/MM3 (ref 140–440)
RBC # BLD AUTO: 4.46 M/MM3 (ref 3.65–5.03)

## 2021-06-15 PROCEDURE — 5A09357 ASSISTANCE WITH RESPIRATORY VENTILATION, LESS THAN 24 CONSECUTIVE HOURS, CONTINUOUS POSITIVE AIRWAY PRESSURE: ICD-10-PCS | Performed by: INTERNAL MEDICINE

## 2021-06-15 RX ADMIN — ENOXAPARIN SODIUM SCH MG: 100 INJECTION SUBCUTANEOUS at 22:15

## 2021-06-15 RX ADMIN — Medication SCH ML: at 22:16

## 2021-06-15 RX ADMIN — ACETAMINOPHEN PRN MG: 325 TABLET ORAL at 23:51

## 2021-06-15 RX ADMIN — POTASSIUM CHLORIDE SCH MEQ: 1500 TABLET, EXTENDED RELEASE ORAL at 12:45

## 2021-06-15 RX ADMIN — POTASSIUM CHLORIDE SCH MEQ: 1500 TABLET, EXTENDED RELEASE ORAL at 18:44

## 2021-06-15 RX ADMIN — AZITHROMYCIN SCH MG: 250 TABLET, FILM COATED ORAL at 09:02

## 2021-06-15 RX ADMIN — MORPHINE SULFATE PRN MG: 2 INJECTION, SOLUTION INTRAMUSCULAR; INTRAVENOUS at 15:33

## 2021-06-15 RX ADMIN — MORPHINE SULFATE PRN MG: 2 INJECTION, SOLUTION INTRAMUSCULAR; INTRAVENOUS at 01:33

## 2021-06-15 RX ADMIN — MORPHINE SULFATE PRN MG: 2 INJECTION, SOLUTION INTRAMUSCULAR; INTRAVENOUS at 05:38

## 2021-06-15 RX ADMIN — MORPHINE SULFATE PRN MG: 2 INJECTION, SOLUTION INTRAMUSCULAR; INTRAVENOUS at 09:02

## 2021-06-15 RX ADMIN — CEFTRIAXONE SODIUM SCH MLS/HR: 2 INJECTION, POWDER, FOR SOLUTION INTRAMUSCULAR; INTRAVENOUS at 09:02

## 2021-06-15 RX ADMIN — Medication SCH ML: at 09:03

## 2021-06-16 LAB
BUN SERPL-MCNC: 10 MG/DL (ref 7–17)
BUN/CREAT SERPL: 17 %
CALCIUM SERPL-MCNC: 9 MG/DL (ref 8.4–10.2)
HCT VFR BLD CALC: 33.3 % (ref 30.3–42.9)
HEMOLYSIS INDEX: 0
HGB BLD-MCNC: 10.4 GM/DL (ref 10.1–14.3)
MCHC RBC AUTO-ENTMCNC: 31 % (ref 30–34)
MCV RBC AUTO: 73 FL (ref 79–97)
PLATELET # BLD: 334 K/MM3 (ref 140–440)
RBC # BLD AUTO: 4.56 M/MM3 (ref 3.65–5.03)

## 2021-06-16 RX ADMIN — AZITHROMYCIN SCH MG: 250 TABLET, FILM COATED ORAL at 09:14

## 2021-06-16 RX ADMIN — CEFTRIAXONE SODIUM SCH MLS/HR: 2 INJECTION, POWDER, FOR SOLUTION INTRAMUSCULAR; INTRAVENOUS at 09:15

## 2021-06-16 RX ADMIN — Medication SCH ML: at 21:26

## 2021-06-16 RX ADMIN — Medication SCH ML: at 09:14

## 2021-06-16 RX ADMIN — ACETAMINOPHEN PRN MG: 325 TABLET ORAL at 16:04

## 2021-06-16 RX ADMIN — FUROSEMIDE SCH MG: 10 INJECTION, SOLUTION INTRAVENOUS at 17:19

## 2021-06-16 RX ADMIN — ENOXAPARIN SODIUM SCH MG: 100 INJECTION SUBCUTANEOUS at 21:25

## 2021-06-17 LAB
BUN SERPL-MCNC: 9 MG/DL (ref 7–17)
BUN/CREAT SERPL: 15 %
CALCIUM SERPL-MCNC: 9 MG/DL (ref 8.4–10.2)
HCT VFR BLD CALC: 31 % (ref 30.3–42.9)
HEMOLYSIS INDEX: 4
HGB BLD-MCNC: 10.6 GM/DL (ref 10.1–14.3)
MCHC RBC AUTO-ENTMCNC: 34 % (ref 30–34)
MCV RBC AUTO: 71 FL (ref 79–97)
PLATELET # BLD: 322 K/MM3 (ref 140–440)
RBC # BLD AUTO: 4.35 M/MM3 (ref 3.65–5.03)

## 2021-06-17 RX ADMIN — FUROSEMIDE SCH MG: 10 INJECTION, SOLUTION INTRAVENOUS at 05:52

## 2021-06-17 RX ADMIN — ENOXAPARIN SODIUM SCH MG: 100 INJECTION SUBCUTANEOUS at 21:35

## 2021-06-17 RX ADMIN — AZITHROMYCIN SCH MG: 250 TABLET, FILM COATED ORAL at 11:13

## 2021-06-17 RX ADMIN — CEFTRIAXONE SODIUM SCH MLS/HR: 2 INJECTION, POWDER, FOR SOLUTION INTRAMUSCULAR; INTRAVENOUS at 11:13

## 2021-06-17 RX ADMIN — Medication SCH ML: at 21:36

## 2021-06-17 RX ADMIN — ASPIRIN SCH MG: 81 TABLET, CHEWABLE ORAL at 11:16

## 2021-06-17 RX ADMIN — Medication SCH ML: at 11:14

## 2021-06-17 RX ADMIN — POTASSIUM CHLORIDE SCH: 1500 TABLET, EXTENDED RELEASE ORAL at 17:05

## 2021-06-17 RX ADMIN — POTASSIUM CHLORIDE SCH MEQ: 1500 TABLET, EXTENDED RELEASE ORAL at 11:13

## 2021-06-17 RX ADMIN — ACETAMINOPHEN PRN MG: 325 TABLET ORAL at 01:47

## 2021-06-17 RX ADMIN — ACETAMINOPHEN PRN MG: 325 TABLET ORAL at 11:40

## 2021-06-17 RX ADMIN — FUROSEMIDE SCH MG: 10 INJECTION, SOLUTION INTRAVENOUS at 17:03

## 2021-06-17 NOTE — VASCULAR LAB REPORT
DUPLEX DOPPLER LOWER EXTREMITY VEINS, BILATERAL



INDICATION / CLINICAL INFORMATION:

Elevated Ddimer, hx dvt.



TECHNIQUE:

Duplex doppler imaging was performed through the veins of both lower extremities using venous keegan
maribel and other maneuvers.



COMPARISON: 

None available.



FINDINGS:

RIGHT COMMON FEMORAL VEIN: Negative.

RIGHT FEMORAL VEIN: Negative.

RIGHT POPLITEAL VEIN: Negative.

RIGHT CALF VEINS: Negative.



LEFT COMMON FEMORAL VEIN: Negative.

LEFT FEMORAL VEIN: Negative.

LEFT POPLITEAL VEIN: Negative.

LEFT CALF VEINS: Negative.



ADDITIONAL FINDINGS: None.



IMPRESSION:

1. No sonographic evidence for DVT in either lower extremity.



Signer Name: Terrell Osorio MD 

Signed: 6/17/2021 10:02 PM

Workstation Name: [id number]

## 2021-06-17 NOTE — XRAY REPORT
CHEST 1 VIEW 



INDICATION:  CHF,Pneumonia.



COMPARISON:  6/12/2021



FINDINGS:

Support devices: None. 



Heart: Stable mild cardiomegaly. 

Lungs/Pleura: Pulmonary venous congestion has resolved. The lungs are clear. No pleural effusion or p
neumothorax. 



Additional findings: None.



IMPRESSION:

 Mild cardiomegaly. Lungs clear.



Signer Name: Stoney James Jr, MD 

Signed: 6/17/2021 8:44 AM

Workstation Name: [id number]

## 2021-06-17 NOTE — CONSULTATION
History of Present Illness


Consult date: 06/17/21


Requesting physician: MARION GOMEZ


Consult reason: congestive heart failure


History of present illness: 





Dilated cardiomyopathy dilated cardiomyopathyThis patient is 36-year-old female 

with a significant history of congestive heart failure, hypertension.  She is 

previously unknown to our practice and is followed by [facility].  Patient presents to [facility] ER via EMS in acu

te hypoxic respiratory failure with initial O2 sat between 60s and 70s.  

Respiratory distress was significantly improved after nebulized albuterol 

treatments and CPAP positive pressure assistance. Patient has not been 

vaccinated against Covid 19.  Patient is currently admitted as Covid negative 

pneumonia. CTA chest is concerning for multifocal infection with atypical or 

viral etiology.  Cardiology is consulted for CHF.  At time of interview, she 

denies any chest pain, abdominal pain, N/V/D, recent fever chills, recent 

illness or known exposures.  





Past History


Past Medical History: heart failure, hypertension, other


Past Surgical History: denies: No surgical history


Social history: smoking (Current daily smoker- 1/2 pack per day.), alcohol 

abuse, full code.  denies: prescription drug abuse, IV drug use


Family history: no significant family history (no pulm issues reported)





Medications and Allergies


                                    Allergies











Allergy/AdvReac Type Severity Reaction Status Date / Time


 


No Known Allergies Allergy   Unverified 06/12/21 18:25











                                Home Medications











 Medication  Instructions  Recorded  Confirmed  Last Taken  Type


 


Apixaban [Eliquis] 5 mg PO QDAY 06/14/21 06/14/21 06/11/21 History


 


Furosemide [Lasix TAB] 80 mg PO BID 06/14/21 06/14/21 06/11/21 History


 


Potassium Chloride [Klor-Con 8] 10 meq PO QDAY 06/14/21 06/14/21 06/11/21 

History


 


lisinopriL [Zestril] 20 mg PO QDAY 06/14/21 06/14/21 06/11/21 History











Active Meds: 


Active Medications





Acetaminophen (Acetaminophen 325 Mg Tab)  650 mg PO Q6H PRN


   PRN Reason: Pain, Mild (1-3)


   Last Admin: 06/17/21 11:40 Dose:  650 mg


   Documented by: 


Aspirin (Aspirin 81 Mg Tab Chew)  81 mg PO QDAY [facility]


   Last Admin: 06/17/21 11:16 Dose:  81 mg


   Documented by: 


Atorvastatin Calcium (Atorvastatin 40 Mg Tab)  40 mg PO QHS [facility]


Carvedilol (Carvedilol 6.25 Mg Tab)  6.25 mg PO BID [facility]


   Last Admin: 06/17/21 11:15 Dose:  6.25 mg


   Documented by: 


Enoxaparin Sodium (Enoxaparin 40 Mg/0.4 Ml Inj)  40 mg SUB-Q QDAY@2200 [facility]; 

Protocol


   Last Admin: 06/16/21 21:25 Dose:  40 mg


   Documented by: 


Furosemide (Furosemide 40 Mg/4 Ml Inj)  40 mg IV 0600,1800 [facility]


   Last Admin: 06/17/21 05:52 Dose:  40 mg


   Documented by: 


Hydralazine HCl (Hydralazine 20 Mg/1 Ml Inj)  10 mg IV Q4HR PRN


   PRN Reason: SBP > 170 or DBP > 100


Losartan Potassium (Losartan 50 Mg Tab)  50 mg PO QDAY [facility]


Magnesium Hydroxide (Magnesium Hydroxide (Mom) Oral Liqd Udc)  30 ml PO Q4H PRN


   PRN Reason: Constipation


Morphine Sulfate (Morphine 2 Mg/1 Ml Inj)  2 mg IV Q4H PRN


   PRN Reason: Pain, Moderate (4-6)


   Last Admin: 06/15/21 15:33 Dose:  2 mg


   Documented by: 


Ondansetron HCl (Ondansetron 4 Mg/2 Ml Inj)  4 mg IV Q8H PRN


   PRN Reason: Nausea And Vomiting


Potassium Chloride (Potassium Chloride Er 20 Meq Tab)  40 meq PO Q4H [facility]


   Stop: 06/17/21 12:01


   Last Admin: 06/17/21 11:13 Dose:  40 meq


   Documented by: 


Sodium Chloride (Sodium Chloride 0.9% 10 Ml Flush Syringe)  10 ml IV BID [facility]


   Last Admin: 06/17/21 11:14 Dose:  10 ml


   Documented by: 


Sodium Chloride (Sodium Chloride 0.9% 10 Ml Flush Syringe)  10 ml IV PRN PRN


   PRN Reason: LINE FLUSH











Review of Systems


Constitutional: no weight loss, no weight gain, no fever, no chills, no sweats


Ears, nose, mouth and throat: no ear pain, no ear discharge, no nose pain, no 

nasal congestion, no nasal discharge


Cardiovascular: edema, shortness of breath, dyspnea on exertion, no chest pain, 

no orthopnea, no palpitations, no rapid/irregular heart beat, no syncope, no 

lightheadedness, no claudication


Respiratory: cough, shortness of breath, dyspnea on exertion, sleep apnea, no 

hemoptysis


Gastrointestinal: no abdominal pain, no nausea, no vomiting, no diarrhea


Genitourinary Female: no flank pain


Musculoskeletal: no neck stiffness, no neck pain, no shooting arm pain, no arm 

numbness/tingling, no low back pain, no shooting leg pain


Integumentary: no rash, no pruritis, no redness, no sores, no wounds


Neurological: no head injury, no paralysis, no weakness, no parathesias, no numb

ness, no tingling, no seizures, no syncope


Psychiatric: no anxiety


Endocrine: no cold intolerance, no heat intolerance


Hematologic/Lymphatic: no easy bruising, no easy bleeding


Allergic/Immunologic: no urticaria





Physical Examination


                                        


                                Last Vital Signs











Temp  98.0 F   06/17/21 07:18


 


Pulse  105 H  06/17/21 11:15


 


Resp  18   06/17/21 11:07


 


BP  156/104   06/17/21 11:15


 


Pulse Ox  92   06/17/21 07:18














General appearance: no acute distress


HEENT: Positive: PERRL, Normocephaly, Mucus Membranes Moist


Neck: Positive: neck supple, trachea midline


Cardiac: Positive: Regular Rhythm, S1/S2


Lungs: Positive: Decreased Breath Sounds


Abdomen: Positive: Unremarkable, Soft


Skin: Negative: Rash, Wound


Extremities: Present: upper extr. pulses, lower extr. pulses, +1 Edema





Results





                                 06/17/21 04:41





                                 06/17/21 04:41


                                       CBC











  06/17/21 Range/Units





  04:41 


 


WBC  11.9 H  (4.5-11.0)  K/mm3


 


RBC  4.35  (3.65-5.03)  M/mm3


 


Hgb  10.6  (10.1-14.3)  gm/dl


 


Hct  31.0  (30.3-42.9)  %


 


Plt Count  322  (140-440)  K/mm3








                          Comprehensive Metabolic Panel











  06/17/21 Range/Units





  04:41 


 


Sodium  139  (137-145)  mmol/L


 


Potassium  3.2 L  (3.6-5.0)  mmol/L


 


Chloride  99.9  ()  mmol/L


 


Carbon Dioxide  29  (22-30)  mmol/L


 


BUN  9  (7-17)  mg/dL


 


Creatinine  0.6  (0.6-1.2)  mg/dL


 


Glucose  110 H  ()  mg/dL


 


Calcium  9.0  (8.4-10.2)  mg/dL














- Imaging and Cardiology


Echo: report reviewed


EKG: report reviewed, image reviewed





EKG interpretations





- Telemetry


EKG Rhythm: Sinus Tachycardia





- EKG


Sinus rhythms and dysrhythmias: sinus tachycardia





Assessment and Plan





Heart failure reduced ejection fraction in setting of dilated cardiomyopathy


* BNP was elevated on admission but is currently normal.  Patient has decreased 

  breath sounds and bilateral lower extremity edema 2+.  Agree with Lasix 40 mg 

  IV twice daily.  Continue strict I/O's.  


* Echocardiogram reviewed (6/13/2021) LVEF is 15 to 20%.  LV is severely 

  dilated.  LV SF is severely decreased.  Borderline LVH.  Left atrium is mildly

   dilated.  Right ventricle is mildly dilated.  Mild pulmonary hypertension RV

  SP is 40 mmHg.


* LVEF is severely decreased.  No previous records for comparison.  Patient is 

  followed by [facility].  Records have been requested.


* Goal-directed therapy with cardioprotective regimen: ASA 81, Coreg 6.25 twice 

  daily, losartan 50.  We will plan to start statin therapy pending lipid panel 

  and liver indices.





Acute respiratory failure with hypoxia


* Initial O2 sat 60s to 70s.  Requiring positive pressure assistance and 

  albuterol nebulized treatments.  Patient is currently maintaining O2 sats 

  without CPAP.


* Management per primary team





Hypokalemia


* Replete potassiu





Elevated D-dimer


* CTA chest is negative for PTE.  Bilateral venous duplex ultrasound is pending.





DVT prophylaxis


* Lovenox SQ





Patient is currently in stable cardiac status.  Will follow





This patient was seen in conjunction with Dr. Lama who agrees with assessment

 and plan of care.


   





- Patient Problems


(1) Acute and chronic respiratory failure with hypoxia


Current Visit: Yes   Status: Acute   





(2) Heart failure with reduced ejection fraction


Current Visit: Yes   Status: Acute   





(3) Dilated cardiomyopathy


Current Visit: Yes   Status: Chronic   





(4) Hypokalemia


Current Visit: Yes   Status: Acute   





(5) Elevated d-dimer


Current Visit: Yes   Status: Acute   





(6) Pneumonia


Current Visit: Yes   Status: Acute   


Qualifiers: 


   Pneumonia type: due to unspecified organism   Laterality: bilateral   Lung 

location: unspecified part of lung   Qualified Code(s): J18.9 - Pneumonia, 

unspecified organism   





(7) DVT prophylaxis


Current Visit: Yes   Status: Acute   





(8) Microcytic anemia


Current Visit: Yes   Status: Acute

## 2021-06-18 LAB
ALBUMIN SERPL-MCNC: 3.4 G/DL (ref 3.9–5)
ALT SERPL-CCNC: 27 UNITS/L (ref 7–56)
BUN SERPL-MCNC: 9 MG/DL (ref 7–17)
BUN/CREAT SERPL: 13 %
CALCIUM SERPL-MCNC: 9.2 MG/DL (ref 8.4–10.2)
HDLC SERPL-MCNC: 35 MG/DL (ref 40–59)
HEMOLYSIS INDEX: 8

## 2021-06-18 RX ADMIN — FUROSEMIDE SCH MG: 10 INJECTION, SOLUTION INTRAVENOUS at 17:24

## 2021-06-18 RX ADMIN — Medication SCH ML: at 22:00

## 2021-06-18 RX ADMIN — Medication SCH ML: at 09:17

## 2021-06-18 RX ADMIN — FUROSEMIDE SCH MG: 10 INJECTION, SOLUTION INTRAVENOUS at 05:23

## 2021-06-18 RX ADMIN — ENOXAPARIN SODIUM SCH MG: 100 INJECTION SUBCUTANEOUS at 22:00

## 2021-06-18 RX ADMIN — LOSARTAN POTASSIUM SCH MG: 50 TABLET, FILM COATED ORAL at 09:17

## 2021-06-18 RX ADMIN — ACETAMINOPHEN PRN MG: 325 TABLET ORAL at 12:04

## 2021-06-18 RX ADMIN — MORPHINE SULFATE PRN MG: 2 INJECTION, SOLUTION INTRAMUSCULAR; INTRAVENOUS at 01:28

## 2021-06-18 RX ADMIN — ASPIRIN SCH MG: 81 TABLET, CHEWABLE ORAL at 09:17

## 2021-06-19 VITALS — SYSTOLIC BLOOD PRESSURE: 129 MMHG | DIASTOLIC BLOOD PRESSURE: 72 MMHG

## 2021-06-19 LAB
HCT VFR BLD CALC: 37.7 % (ref 30.3–42.9)
HGB BLD-MCNC: 12 GM/DL (ref 10.1–14.3)
MCHC RBC AUTO-ENTMCNC: 32 % (ref 30–34)
MCV RBC AUTO: 73 FL (ref 79–97)
PLATELET # BLD: 415 K/MM3 (ref 140–440)
RBC # BLD AUTO: 5.18 M/MM3 (ref 3.65–5.03)

## 2021-06-19 RX ADMIN — FUROSEMIDE SCH MG: 10 INJECTION, SOLUTION INTRAVENOUS at 06:51

## 2021-06-19 RX ADMIN — LOSARTAN POTASSIUM SCH MG: 50 TABLET, FILM COATED ORAL at 09:07

## 2021-06-19 RX ADMIN — ASPIRIN SCH MG: 81 TABLET, CHEWABLE ORAL at 09:06

## 2021-06-19 RX ADMIN — Medication SCH: at 11:47

## 2021-06-19 NOTE — DISCHARGE SUMMARY
Providers





- Providers


Date of Admission: 


06/12/21 22:35





Date of discharge: 06/19/21


Attending physician: 


MARION GOMEZ





                                        





06/12/21 23:15


Consult to Dietitian/Nutrition [CONS] Routine 


   Physician Instructions: 


   Reason For Exam: 


   Reason for Consult: Diet education


Consult to Physician [CONS] Routine 


   Comment: 


   Consulting Provider: GUMARO MILLS


   Physician Instructions: 


   Reason For Exam: Pneumonia R/O COVID-19





06/16/21 16:01


Consult to Physician [CONS] Routine 


   Comment: 


   Consulting Provider: KRIS BEDOYA


   Physician Instructions: 


   Reason For Exam: CHF











Primary care physician: 


PRIMARY CARE MD








Hospitalization


Condition: Fair


Hospital course: 


36-year-old -American female with significant past medical history of 

hypertension and CHF-unknown EF presenting the emergency room today complaining 

of shortness of breath.  Shortness of breath was said to have started last night

 and she went into more severe respiratory distress earlier today and therefore 

called the EMS.  She has been compliant with her medications and upon arrival of

 EMS oxygen saturation was between the 60s and 70s.  She had nebulizing 

treatments with albuterol and also subsequently placed on CPAP.  She denies any 

chest pain.  She has had some mild cough which is nonproductive.  Denies any 

fever or chills, no nausea vomiting and no diarrhea. atient denies any sick 

contacts and no recent travel.  She denies any contact with anyone with COVID-

19.She has not been vaccinated against COVID-19.





Work-up in the emergency room today reveals cardiomegaly, right lower lobe 

consolidative airspace opacity worrisome for pneumonia on the chest x-ray. CT 

angiogram of the chest reveals moderate patchy bilateral airspace opacities with

 a mid to lower lung zone predominance.  Findings worrisome for multifocal 

infection with atypical or viral etiology is possible. Labs were significant for

 mild hypokalemia of 3.2.. Patient was admitted with acute respiratory failure 

possibly secondary to pneumonia. She was eventually diagnosed with acute 

respiratory failure due to acute on chronic CHF and bilateral pneumonia, sepsis.

 She was evaluated by Cardiology, Pulmonology, ID Physician. She improved slowly

 and eventually weaned off Oxygen on 6/19/21, and discharged home to follow as 

outpatient..











Sepsis.  Present on admission.  Patient meets criteria given the leukocytosis, 

tachycardia and diagnosis of pneumonia.


Sepsis secondary to pneumonia





Acute hypoxemic respiratory failure due to CHF and bilateral pneumonia


supplemental Oxygen





Acute on chronic systolic Congestive heart failure


Echo  EF15-20%





Bilateral Pneumonia


ID Physician following


On Rocephin





Hypokalemia





6/13/2021.  Patient currently with Venturi mask 15 L/min FiO2 50%.  Patient 

reportedly desaturated on nasal cannula to the 80s.  Pulmonary consultation 

pending.  Patient with normal lactic acid, ferritin and D-dimer.  However, CTA 

suggestive of multifocal opacities suggestive of viral pneumonia.  Potassium was

 repleted.  Follow-up procalcitonin level.  Continue IV antibiotics.  ID 

consultation pending.





6/14/2021.  Covid testing found to be negative.  Continue empiric antibiotics 

per ID recommendations for now.  Await cardiology consultation for heart 

failure.  Echocardiogram also pending.





6/15/21  Patient with acute resp failure. Etiology likely secondary to bilateral

 pneumonia and possibly CHF. BNP is elevated. Will repeat BMP in am. 





6/16/21 Patient with acute respiratory failure due to acute on chronic CHF and 

bilateral pneumonia. ID Physician and Pulm following. Cardiology to see. She is 

improving. Now on Oxygen by NC. Was put on BIPAP on admission.





6/17/21 Patient with acute respiratory failure due to acute on chronic CHF and 

bilateral pneumonia. She is being followed by multiple specialists. She is now 

on ventimask at 15 l/min. Cardiology to evaluate. She has seen cardiologist at 

[place].


Repeat X Ray shows mild cardiomegaly, clear lungs.





6/18/21  Patient with acute resp failure. She is still on Oxygen by ventimask. 

patient has acute on chronic systolic heart failure and bilateral pneumonia. 

Cardiology, Pulmonology and ID following. Will discuss with Resp Therapist about

 attempting to wean Oxygen.





6/19/21 Patient with acute respiratory failure due to acute on chronic systolic 

CHF( EF 15-20%) and bilateral pneumonia, sepsis.  Now Oxygen sat 91 on room air.

 Therefore stable to discharge home to follow as outpatient.








Disposition: DC-01 TO HOME OR SELFCARE


Final Discharge Diagnosis (Prints w/discharge instructions): 1.Acute resp 

failure due to acute on chronic systolic CHF, bilateral pneumonia.  2.Sepsis





- Discharge Diagnoses


(1) Acute and chronic respiratory failure with hypoxia


Status: Acute   





(2) Acute on chronic HFrEF (heart failure with reduced ejection fraction)


Status: Acute   





(3) Bilateral pneumonia


Status: Acute   





(4) Hypokalemia


Status: Acute   





(5) Sepsis


Status: Acute   Comment: due to pneumonia   





Core Measure Documentation





- Palliative Care


Palliative Care/ Comfort Measures: Not Applicable





- Core Measures


Any of the following diagnoses?: heart failure





- Heart Failure Discharge Requirements


ACE/ARB for LVSD if EF <40%: Yes


Beta blocker at discharge: Yes





Exam





- Constitutional


Vitals: 


                                        











Temp Pulse Resp BP Pulse Ox


 


 98.1 F   87   20   129/72   94 


 


 06/19/21 00:07  06/19/21 00:07  06/19/21 00:07  06/19/21 00:07  06/19/21 11:47














Plan


Activity: advance as tolerated


Diet: low fat, low cholesterol, low salt


Special Instructions: other (1.F/U with PCP in 1 week. 2. F/U with Cardiology at

 [place] in 1 week)


Plan of Treatment: 


1.Follow up with PCP in 3-5 days


2.Follow up with Cardiology at [facility] in 1 week


Follow up with: 


PRIMARY CARE,MD [Primary Care Provider] - 3-5 Days


Prescriptions: 


carvediloL [Coreg] 12.5 mg PO BID #60 tablet

## 2022-07-01 ENCOUNTER — HOSPITAL ENCOUNTER (EMERGENCY)
Dept: HOSPITAL 5 - ED | Age: 37
LOS: 1 days | Discharge: HOME | End: 2022-07-02
Payer: MEDICAID

## 2022-07-01 DIAGNOSIS — E11.9: ICD-10-CM

## 2022-07-01 DIAGNOSIS — I50.9: ICD-10-CM

## 2022-07-01 DIAGNOSIS — R05.9: ICD-10-CM

## 2022-07-01 DIAGNOSIS — I11.0: Primary | ICD-10-CM

## 2022-07-01 DIAGNOSIS — R50.9: ICD-10-CM

## 2022-07-01 DIAGNOSIS — J44.9: ICD-10-CM

## 2022-07-01 DIAGNOSIS — F17.290: ICD-10-CM

## 2022-07-01 DIAGNOSIS — E66.9: ICD-10-CM

## 2022-07-01 PROCEDURE — 80053 COMPREHEN METABOLIC PANEL: CPT

## 2022-07-01 PROCEDURE — 84703 CHORIONIC GONADOTROPIN ASSAY: CPT

## 2022-07-01 PROCEDURE — 71045 X-RAY EXAM CHEST 1 VIEW: CPT

## 2022-07-01 PROCEDURE — 96374 THER/PROPH/DIAG INJ IV PUSH: CPT

## 2022-07-01 PROCEDURE — 96375 TX/PRO/DX INJ NEW DRUG ADDON: CPT

## 2022-07-01 PROCEDURE — 85025 COMPLETE CBC W/AUTO DIFF WBC: CPT

## 2022-07-01 PROCEDURE — 99284 EMERGENCY DEPT VISIT MOD MDM: CPT

## 2022-07-01 PROCEDURE — 36415 COLL VENOUS BLD VENIPUNCTURE: CPT

## 2022-07-02 VITALS — DIASTOLIC BLOOD PRESSURE: 85 MMHG | SYSTOLIC BLOOD PRESSURE: 162 MMHG

## 2022-07-02 LAB
ALBUMIN SERPL-MCNC: 3.9 G/DL (ref 3.9–5)
ALT SERPL-CCNC: 34 UNITS/L (ref 7–56)
BASOPHILS # (AUTO): 0 K/MM3 (ref 0–0.1)
BASOPHILS NFR BLD AUTO: 0.4 % (ref 0–1.8)
BUN SERPL-MCNC: 8 MG/DL (ref 7–17)
BUN/CREAT SERPL: 9 %
CALCIUM SERPL-MCNC: 9 MG/DL (ref 8.4–10.2)
EOSINOPHIL # BLD AUTO: 0 K/MM3 (ref 0–0.4)
EOSINOPHIL NFR BLD AUTO: 0.6 % (ref 0–4.3)
HCT VFR BLD CALC: 37.3 % (ref 30.3–42.9)
HEMOLYSIS INDEX: 2
HGB BLD-MCNC: 11.9 GM/DL (ref 10.1–14.3)
LYMPHOCYTES # BLD AUTO: 0.3 K/MM3 (ref 1.2–5.4)
LYMPHOCYTES NFR BLD AUTO: 4.3 % (ref 13.4–35)
MCHC RBC AUTO-ENTMCNC: 32 % (ref 30–34)
MCV RBC AUTO: 79 FL (ref 79–97)
MONOCYTES # (AUTO): 0.6 K/MM3 (ref 0–0.8)
MONOCYTES % (AUTO): 7.5 % (ref 0–7.3)
PLATELET # BLD: 265 K/MM3 (ref 140–440)
RBC # BLD AUTO: 4.75 M/MM3 (ref 3.65–5.03)

## 2022-07-02 NOTE — XRAY REPORT
XR chest 1V ap



INDICATION / CLINICAL INFORMATION: Dyspnea.



COMPARISON: 6/17/2021



FINDINGS:



SUPPORT DEVICES: None.

HEART /PULMONARY VASCULATURE: Cardiac enlargement with pulmonary vasculature congestion. 

LUNGS / PLEURA: Mild diffuse increased interstitial markings. No focal airspace consolidation. No siz
able pleural effusion. No pneumothorax. 



ADDITIONAL FINDINGS: No significant additional findings.



IMPRESSION:

Cardiac enlargement with pulmonary vasculature congestion, suggestive of CHF. Mild interstitial edema
 suspected. No focal airspace consolidation.



Signer Name: Gallo Jung MD 

Signed: 7/2/2022 3:40 AM

Workstation Name: [email or web address]

## 2022-07-02 NOTE — EMERGENCY DEPARTMENT REPORT
ED General Adult HPI





- General


Chief complaint: Headache


Stated complaint: BODYACHES


Time Seen by Provider: 07/01/22 23:07


Source: patient


Mode of arrival: Stretcher


Limitations: No Limitations





- History of Present Illness


Initial comments: 





Reporting dizziness, headache and bodyaches since this PM.


pt is not vaccinated hasn;t been tested for covid, no URI sytpoms history of chf




-: Gradual, hour(s)


Location: head


Radiation: non-radiation


Severity scale (0 -10): 10


Quality: aching


Consistency: constant


Treatments Prior to Arrival: none





- Related Data


                                Home Medications











 Medication  Instructions  Recorded  Confirmed  Last Taken


 


Apixaban [Eliquis] 5 mg PO QDAY 06/14/21 06/14/21 06/11/21


 


Furosemide [Lasix TAB] 80 mg PO BID 06/14/21 06/14/21 06/11/21


 


Potassium Chloride [Klor-Con 8] 10 meq PO QDAY 06/14/21 06/14/21 06/11/21


 


lisinopriL [Zestril TAB] 20 mg PO QDAY 06/14/21 06/14/21 06/11/21








                                  Previous Rx's











 Medication  Instructions  Recorded  Last Taken  Type


 


carvediloL [Coreg] 12.5 mg PO BID #60 tablet 06/19/21 Unknown Rx











                                    Allergies











Allergy/AdvReac Type Severity Reaction Status Date / Time


 


No Known Allergies Allergy   Unverified 06/12/21 18:25














ED Review of Systems


ROS: 


Stated complaint: BODYACHES


Other details as noted in HPI





Constitutional: denies: chills, fever


Eyes: denies: eye pain, eye discharge, vision change


ENT: denies: ear pain, throat pain


Respiratory: denies: cough, shortness of breath, wheezing


Cardiovascular: denies: chest pain, palpitations


Endocrine: no symptoms reported


Gastrointestinal: denies: abdominal pain, nausea, diarrhea


Genitourinary: denies: urgency, dysuria, discharge


Musculoskeletal: denies: back pain, joint swelling, arthralgia


Skin: denies: rash, lesions


Neurological: denies: headache, weakness, paresthesias


Psychiatric: denies: anxiety, depression


Hematological/Lymphatic: denies: easy bleeding, easy bruising





ED Past Medical Hx





- Past Medical History


Previous Medical History?: Yes


Hx Hypertension: Yes


Hx Congestive Heart Failure: Yes


Hx Asthma: Yes


Hx COPD: Yes


Additional medical history: Obesity.  Chronic Cough





- Surgical History


Past Surgical History?: No





- Social History


Smoking Status: Current Every Day Smoker


Substance Use Type: None





- Medications


Home Medications: 


                                Home Medications











 Medication  Instructions  Recorded  Confirmed  Last Taken  Type


 


Apixaban [Eliquis] 5 mg PO QDAY 06/14/21 06/14/21 06/11/21 History


 


Furosemide [Lasix TAB] 80 mg PO BID 06/14/21 06/14/21 06/11/21 History


 


Potassium Chloride [Klor-Con 8] 10 meq PO QDAY 06/14/21 06/14/21 06/11/21 

History


 


lisinopriL [Zestril TAB] 20 mg PO QDAY 06/14/21 06/14/21 06/11/21 History


 


carvediloL [Coreg] 12.5 mg PO BID #60 tablet 06/19/21  Unknown Rx














ED Physical Exam





- General


Limitations: No Limitations


General appearance: alert, in no apparent distress





- Head


Head exam: Present: atraumatic, normocephalic





- Eye


Eye exam: Present: normal appearance





- ENT


ENT exam: Present: mucous membranes moist





- Neck


Neck exam: Present: normal inspection





- Respiratory


Respiratory exam: Present: normal lung sounds bilaterally, rales.  Absent: 

respiratory distress





- Cardiovascular


Cardiovascular Exam: Present: normal rhythm, tachycardia.  Absent: systolic 

murmur, diastolic murmur, rubs, gallop





- GI/Abdominal


GI/Abdominal exam: Present: soft, normal bowel sounds





- Extremities Exam


Extremities exam: Present: normal inspection





- Back Exam


Back exam: Present: normal inspection





- Neurological Exam


Neurological exam: Present: alert, oriented X3





- Psychiatric


Psychiatric exam: Present: normal affect, normal mood





- Skin


Skin exam: Present: warm, dry, intact, normal color.  Absent: rash





ED Course





                                   Vital Signs











  07/01/22





  22:35


 


Temperature 99.5 F


 


Pulse Rate 127 H


 


Respiratory 20





Rate 


 


Blood Pressure 177/97


 


O2 Sat by Pulse 98





Oximetry 














ED Medical Decision Making





- Lab Data


Result diagrams: 


                                 07/01/22 23:56





                                 07/01/22 23:56





- Radiology Data


Radiology results: report reviewed, image reviewed





- Medical Decision Making





work up showed normal ewbc, chemsirty in unremarkable , x ray shwoed CHF lasix 

given toradol given HR down to 90 o2 sat 98 on RA , advised pt to take covid 

test and get the new antiviral if positive , no covid test currently in ED pt 

understands and verbalised understanding 


Critical care attestation.: 


If time is entered above; I have spent that time in minutes in the direct care 

of this critically ill patient, excluding procedure time.








ED Disposition


Clinical Impression: 


 Congestive heart failure (CHF), Fever





Disposition: 01 HOME / SELF CARE / HOMELESS


Is pt being admited?: No


Does the pt Need Aspirin: No


Condition: Stable


Instructions:  Fever, Adult, Heart Failure, Self Care, Easy-to-Read


Referrals: 


YORDY CALHOUN MD [Primary Care Provider] - 3-5 Days

## 2023-04-26 NOTE — PROGRESS NOTE
Assessment and Plan





35 y/o obese female with acute respiratory failure, cardiomegaly, likely related

to CHF exacerbation.





1.  Follow up echo results


2.  Smoking cessation


3.  Lasix 20mg IV x1 now


4.  Weight loss


5.  Appears stable for transfer to [facility]





Subjective


Date of service: 06/14/21


Interval history: 





Patient sitting up on bed.  Currently on venti mask at 97%.  Cardiomegaly on CX

R.  





Objective


                               Vital Signs - 12hr











  06/14/21 06/14/21 06/14/21





  00:00 01:07 02:51


 


Temperature 98.5 F  


 


Pulse Rate 130 H 121 H 119 H


 


Pulse Rate [ 130 H  





From Monitor]   


 


Respiratory 36 H 34 H 37 H





Rate   


 


Blood Pressure  155/89 149/96


 


O2 Sat by Pulse 91 100 85





Oximetry   














  06/14/21 06/14/21 06/14/21





  03:01 03:11 03:21


 


Temperature   


 


Pulse Rate 116 H 118 H 122 H


 


Pulse Rate [   





From Monitor]   


 


Respiratory 41 H 38 H 28 H





Rate   


 


Blood Pressure 154/100 154/100 149/96


 


O2 Sat by Pulse 80 L 82 L 82 L





Oximetry   














  06/14/21 06/14/21 06/14/21





  03:31 03:41 03:51


 


Temperature   


 


Pulse Rate 121 H 118 H 122 H


 


Pulse Rate [   





From Monitor]   


 


Respiratory 39 H 39 H 36 H





Rate   


 


Blood Pressure 149/96 149/96 149/96


 


O2 Sat by Pulse 84 86 87





Oximetry   














  06/14/21 06/14/21 06/14/21





  04:00 04:01 04:11


 


Temperature 98.8 F  


 


Pulse Rate  123 H 117 H


 


Pulse Rate [ 119 H  





From Monitor]   


 


Respiratory 35 H 43 H 43 H





Rate   


 


Blood Pressure  162/100 162/100


 


O2 Sat by Pulse 90 81 L 92





Oximetry   














  06/14/21 06/14/21 06/14/21





  04:21 04:31 04:41


 


Temperature   


 


Pulse Rate 115 H 118 H 117 H


 


Pulse Rate [   





From Monitor]   


 


Respiratory 34 H 40 H 35 H





Rate   


 


Blood Pressure 162/100 162/100 162/100


 


O2 Sat by Pulse 90 89 89





Oximetry   














  06/14/21 06/14/21 06/14/21





  04:51 05:01 05:11


 


Temperature   


 


Pulse Rate 123 H 119 H 118 H


 


Pulse Rate [   





From Monitor]   


 


Respiratory 42 H 28 H 47 H





Rate   


 


Blood Pressure 162/100 147/92 147/92


 


O2 Sat by Pulse 92 87 93





Oximetry   














  06/14/21 06/14/21 06/14/21





  05:21 05:31 05:41


 


Temperature   


 


Pulse Rate 122 H 115 H 118 H


 


Pulse Rate [   





From Monitor]   


 


Respiratory 36 H 98 H 31 H





Rate   


 


Blood Pressure 147/92 147/92 147/92


 


O2 Sat by Pulse 90 93 91





Oximetry   














  06/14/21 06/14/21 06/14/21





  05:51 06:01 06:11


 


Temperature   


 


Pulse Rate 115 H 123 H 116 H


 


Pulse Rate [   





From Monitor]   


 


Respiratory 36 H 16 37 H





Rate   


 


Blood Pressure 147/92 149/95 149/95


 


O2 Sat by Pulse 90 84 88





Oximetry   














  06/14/21 06/14/21 06/14/21





  06:21 06:31 06:41


 


Temperature   


 


Pulse Rate 126 H 124 H 119 H


 


Pulse Rate [   





From Monitor]   


 


Respiratory 17 39 H 40 H





Rate   


 


Blood Pressure 149/95 149/95 149/95


 


O2 Sat by Pulse 90 91 90





Oximetry   














  06/14/21 06/14/21 06/14/21





  06:51 07:01 07:11


 


Temperature   


 


Pulse Rate 117 H 118 H 119 H


 


Pulse Rate [   





From Monitor]   


 


Respiratory 36 H 24 40 H





Rate   


 


Blood Pressure 149/95 162/98 162/98


 


O2 Sat by Pulse 89 89 88





Oximetry   














  06/14/21 06/14/21 06/14/21





  07:21 07:31 07:41


 


Temperature   


 


Pulse Rate 116 H 116 H 119 H


 


Pulse Rate [   





From Monitor]   


 


Respiratory 25 H 37 H 31 H





Rate   


 


Blood Pressure 162/98 162/98 162/98


 


O2 Sat by Pulse 92 92 93





Oximetry   














  06/14/21 06/14/21 06/14/21





  07:51 07:52 08:00


 


Temperature   


 


Pulse Rate 128 H  119 H


 


Pulse Rate [   119 H





From Monitor]   


 


Respiratory 21  36 H





Rate   


 


Blood Pressure 162/98  


 


O2 Sat by Pulse 92 94 92





Oximetry   














  06/14/21 06/14/21 06/14/21





  08:01 08:11 08:18


 


Temperature   97.9 F


 


Pulse Rate 121 H 119 H 


 


Pulse Rate [   





From Monitor]   


 


Respiratory 49 H 53 H 





Rate   


 


Blood Pressure 141/102 141/102 


 


O2 Sat by Pulse 91 93 





Oximetry   














  06/14/21 06/14/21 06/14/21





  08:21 08:31 08:41


 


Temperature   


 


Pulse Rate 120 H 121 H 115 H


 


Pulse Rate [   





From Monitor]   


 


Respiratory 35 H 43 H 39 H





Rate   


 


Blood Pressure 141/102 141/102 141/102


 


O2 Sat by Pulse 95 88 91





Oximetry   














  06/14/21 06/14/21 06/14/21





  08:51 09:01 09:11


 


Temperature   


 


Pulse Rate 116 H 123 H 


 


Pulse Rate [   





From Monitor]   


 


Respiratory 38 H 46 H 





Rate   


 


Blood Pressure 141/102 131/96 131/96


 


O2 Sat by Pulse 85 87 89





Oximetry   














  06/14/21 06/14/21 06/14/21





  09:21 09:31 09:41


 


Temperature   


 


Pulse Rate 117 H 116 H 114 H


 


Pulse Rate [   





From Monitor]   


 


Respiratory 47 H 40 H 37 H





Rate   


 


Blood Pressure 131/96 141/102 141/102


 


O2 Sat by Pulse 89 93 91





Oximetry   














  06/14/21 06/14/21 06/14/21





  09:51 10:01 10:11


 


Temperature   


 


Pulse Rate 116 H 118 H 116 H


 


Pulse Rate [   





From Monitor]   


 


Respiratory 39 H 48 H 39 H





Rate   


 


Blood Pressure 141/102 141/102 142/80


 


O2 Sat by Pulse 90 89 91





Oximetry   














  06/14/21 06/14/21 06/14/21





  10:21 10:31 10:41


 


Temperature   


 


Pulse Rate 113 H 111 H 116 H


 


Pulse Rate [   





From Monitor]   


 


Respiratory 41 H 36 H 38 H





Rate   


 


Blood Pressure 142/80 142/80 142/80


 


O2 Sat by Pulse 94 92 94





Oximetry   














  06/14/21 06/14/21





  10:51 11:01


 


Temperature  


 


Pulse Rate 125 H 119 H


 


Pulse Rate [  





From Monitor]  


 


Respiratory 29 H 34 H





Rate  


 


Blood Pressure 142/80 137/90


 


O2 Sat by Pulse 96 85





Oximetry  











Constitutional: no acute distress, alert


Eyes: non-icteric


ENT: oropharynx moist


Neck: supple


Effort: normal


Ascultation: Bilateral: clear (anteriorly)


Cardiovascular: other (sinus tachy, no mrg)


Gastrointestinal: normoactive bowel sounds, soft, non-tender, non-distended


Integumentary: normal


Extremities: no cyanosis, no edema, pink and warm


Neurologic: normal mental status, non-focal exam, pupils equal and round


Psychiatric: mood appropriate, affect normal


CBC and BMP: 


                                 06/13/21 06:17





                                 06/13/21 06:17


ABG, PT/INR, D-dimer: 


ABG











ABG pH  7.436 pH Units (7.350-7.450)   06/12/21  18:56    


 


ABG pCO2  41.9 mm Hg  06/12/21  18:56    


 


ABG pO2  196.3 mm Hg (80.0-90.0)  H  06/12/21  18:56    


 


ABG O2 Saturation  99.2 % (95.0-99.0)  H  06/12/21  18:56    





PT/INR, D-dimer











PT  14.3 Sec. (12.2-14.9)   06/13/21  06:17    


 


INR  1.05  (0.87-1.13)   06/13/21  06:17    


 


D-Dimer  237.19 ng/mlDDU (0-234)  H  06/13/21  19:27    








Abnormal lab findings: 


                                  Abnormal Labs











  06/12/21 06/12/21 06/12/21





  18:45 18:45 18:56


 


WBC  16.3 H  


 


MCV  73 L  


 


MCH  23 L  


 


RDW  19.2 H  


 


Seg Neuts % (Manual)  94.0 H  


 


Lymphocytes % (Manual)  2.0 L  


 


Seg Neutrophils # Man  15.3 H  


 


Lymphocytes # (Manual)  0.3 L  


 


D-Dimer   


 


ABG pO2    196.3 H


 


ABG HCO3    27.6 H


 


ABG O2 Saturation    99.2 H


 


ABG Base Excess    3.1 H


 


ABG Hemoglobin    11.8 L


 


Potassium   3.2 L 


 


Glucose   108 H 


 


POC Glucose   


 


Lactate Dehydrogenase   


 


C-Reactive Protein   


 


NT-Pro-B Natriuret Pep   1203 H 














  06/12/21 06/13/21 06/13/21





  20:16 06:17 06:17


 


WBC   21.3 H 


 


MCV   73 L 


 


MCH   23 L 


 


RDW   19.5 H 


 


Seg Neuts % (Manual)   96.0 H 


 


Lymphocytes % (Manual)   1.0 L 


 


Seg Neutrophils # Man   20.4 H 


 


Lymphocytes # (Manual)   0.2 L 


 


D-Dimer   


 


ABG pO2   


 


ABG HCO3   


 


ABG O2 Saturation   


 


ABG Base Excess   


 


ABG Hemoglobin   


 


Potassium   


 


Glucose  112 H   129 H


 


POC Glucose   


 


Lactate Dehydrogenase  256 H  


 


C-Reactive Protein  6.50 H  


 


NT-Pro-B Natriuret Pep   














  06/13/21 06/13/21 06/13/21





  14:04 19:27 22:10


 


WBC   


 


MCV   


 


MCH   


 


RDW   


 


Seg Neuts % (Manual)   


 


Lymphocytes % (Manual)   


 


Seg Neutrophils # Man   


 


Lymphocytes # (Manual)   


 


D-Dimer   237.19 H 


 


ABG pO2   


 


ABG HCO3   


 


ABG O2 Saturation   


 


ABG Base Excess   


 


ABG Hemoglobin   


 


Potassium   


 


Glucose   


 


POC Glucose    113 H


 


Lactate Dehydrogenase  294 H  


 


C-Reactive Protein  5.50 H  


 


NT-Pro-B Natriuret Pep
Assessment and Plan





37 y/o obese female with acute respiratory failure, cardiomegaly, likely related

to CHF exacerbation.





6/15/21:  Echo still not read yet.  Will give lasix 20mg IV now.  Suggest 

checking labs this am to follow up renal function and K.  Still feel patient is 

appropriate for transfer to floor, but likely needs tele as she most likely will

have cardiac abnormalities on echo.  If normal, med/surge would be fine.





1.  Follow up echo results


2.  Smoking cessation


3.  Lasix 20mg IV x1 now


4.  Weight loss


5.  Appears stable for transfer to Med/Surge





Subjective


Date of service: 06/15/21


Interval history: 





Currently on Bipap stable.  No acute events.  Put out some with lasix ordered on

yesterday.





Objective


                               Vital Signs - 12hr











  06/14/21 06/14/21 06/14/21





  15:51 16:00 16:01


 


Temperature  97.9 F 


 


Pulse Rate 115 H 115 H 115 H


 


Pulse Rate [  115 H 





From Monitor]   


 


Respiratory 26 H 45 H 45 H





Rate   


 


Respiratory   





Rate [Chest]   


 


Respiratory   





Rate [Head]   


 


Blood Pressure 143/77  135/78


 


O2 Sat by Pulse 93 87 87





Oximetry   














  06/14/21 06/14/21 06/14/21





  16:10 16:21 16:31


 


Temperature   


 


Pulse Rate 111 H 115 H 114 H


 


Pulse Rate [   





From Monitor]   


 


Respiratory 29 H 61 H 57 H





Rate   


 


Respiratory   





Rate [Chest]   


 


Respiratory   





Rate [Head]   


 


Blood Pressure 135/78 143/77 143/77


 


O2 Sat by Pulse 92 90 89





Oximetry   














  06/14/21 06/14/21 06/14/21





  16:41 16:51 17:01


 


Temperature   


 


Pulse Rate 116 H 117 H 115 H


 


Pulse Rate [   





From Monitor]   


 


Respiratory 49 H 38 H 51 H





Rate   


 


Respiratory   





Rate [Chest]   


 


Respiratory   





Rate [Head]   


 


Blood Pressure 135/78 135/78 160/76


 


O2 Sat by Pulse 92 88 82 L





Oximetry   














  06/14/21 06/14/21 06/14/21





  17:11 17:21 17:31


 


Temperature   


 


Pulse Rate 121 H 117 H 120 H


 


Pulse Rate [   





From Monitor]   


 


Respiratory 40 H 51 H 46 H





Rate   


 


Respiratory   





Rate [Chest]   


 


Respiratory   





Rate [Head]   


 


Blood Pressure 160/76 160/76 160/76


 


O2 Sat by Pulse 89 89 92





Oximetry   














  06/14/21 06/14/21 06/14/21





  17:41 17:51 18:01


 


Temperature   


 


Pulse Rate 119 H 122 H 121 H


 


Pulse Rate [   





From Monitor]   


 


Respiratory 53 H 38 H 51 H





Rate   


 


Respiratory   





Rate [Chest]   


 


Respiratory   





Rate [Head]   


 


Blood Pressure 160/76 160/76 162/72


 


O2 Sat by Pulse 95 97 88





Oximetry   














  06/14/21 06/14/21 06/14/21





  18:11 18:21 18:31


 


Temperature   


 


Pulse Rate 118 H 125 H 126 H


 


Pulse Rate [   





From Monitor]   


 


Respiratory 46 H 46 H 52 H





Rate   


 


Respiratory   





Rate [Chest]   


 


Respiratory   





Rate [Head]   


 


Blood Pressure 160/76 160/76 160/76


 


O2 Sat by Pulse 92 92 92





Oximetry   














  06/14/21 06/14/21 06/14/21





  18:41 18:51 19:01


 


Temperature   


 


Pulse Rate 115 H 116 H 117 H


 


Pulse Rate [   





From Monitor]   


 


Respiratory 44 H 39 H 41 H





Rate   


 


Respiratory   





Rate [Chest]   


 


Respiratory   





Rate [Head]   


 


Blood Pressure 160/76 160/76 160/76


 


O2 Sat by Pulse 90 88 92





Oximetry   














  06/14/21 06/14/21 06/14/21





  19:45 20:00 20:01


 


Temperature  97.8 F 


 


Pulse Rate 115 H  118 H


 


Pulse Rate [  118 H 





From Monitor]   


 


Respiratory  32 H 32 H





Rate   


 


Respiratory   





Rate [Chest]   


 


Respiratory   





Rate [Head]   


 


Blood Pressure   121/97


 


O2 Sat by Pulse  91 91





Oximetry   














  06/14/21 06/14/21 06/14/21





  21:01 21:30 21:35


 


Temperature   


 


Pulse Rate   112 H


 


Pulse Rate [   





From Monitor]   


 


Respiratory  22 22





Rate   


 


Respiratory  22 





Rate [Chest]   


 


Respiratory  22 





Rate [Head]   


 


Blood Pressure 121/97  


 


O2 Sat by Pulse 87  97





Oximetry   














  06/14/21 06/14/21 06/14/21





  22:00 22:43 23:00


 


Temperature   


 


Pulse Rate 111 H 106 H 105 H


 


Pulse Rate [   





From Monitor]   


 


Respiratory 24 29 H 12





Rate   


 


Respiratory   





Rate [Chest]   


 


Respiratory   





Rate [Head]   


 


Blood Pressure 131/87 131/87 131/87


 


O2 Sat by Pulse 97 98 100





Oximetry   














  06/15/21 06/15/21 06/15/21





  00:00 00:30 01:00


 


Temperature 97.8 F  


 


Pulse Rate 111 H 105 H 107 H


 


Pulse Rate [ 111 H  





From Monitor]   


 


Respiratory 33 H 35 H 29 H





Rate   


 


Respiratory   





Rate [Chest]   


 


Respiratory   





Rate [Head]   


 


Blood Pressure 134/82 125/74 116/69


 


O2 Sat by Pulse 100 97 95





Oximetry   














  06/15/21 06/15/21 06/15/21





  01:30 01:33 02:00


 


Temperature   


 


Pulse Rate 102 H  101 H


 


Pulse Rate [   





From Monitor]   


 


Respiratory 34 H 30 H 32 H





Rate   


 


Respiratory   





Rate [Chest]   


 


Respiratory   





Rate [Head]   


 


Blood Pressure 116/69  116/69


 


O2 Sat by Pulse 96  95





Oximetry   














  06/15/21 06/15/21 06/15/21





  02:03 02:30 03:00


 


Temperature   


 


Pulse Rate  101 H 99 H


 


Pulse Rate [   





From Monitor]   


 


Respiratory 30 H 32 H 31 H





Rate   


 


Respiratory   





Rate [Chest]   


 


Respiratory   





Rate [Head]   


 


Blood Pressure  105/72 120/66


 


O2 Sat by Pulse  95 91





Oximetry   














  06/15/21 06/15/21





  03:45 03:48


 


Temperature  


 


Pulse Rate 110 H 


 


Pulse Rate [  





From Monitor]  


 


Respiratory 28 H 





Rate  


 


Respiratory  





Rate [Chest]  


 


Respiratory  





Rate [Head]  


 


Blood Pressure  


 


O2 Sat by Pulse 98 98





Oximetry  











Constitutional: no acute distress, alert


Eyes: non-icteric


ENT: oropharynx moist


Neck: supple


Effort: normal


Ascultation: Bilateral: clear (anteriorly)


Cardiovascular: other (sinus tachy, no mrg)


Gastrointestinal: normoactive bowel sounds, soft, non-tender, non-distended


Integumentary: normal


Extremities: no cyanosis, no edema, pink and warm


Neurologic: normal mental status, non-focal exam, pupils equal and round


Psychiatric: mood appropriate, affect normal


CBC and BMP: 


                                 06/13/21 06:17





                                 06/13/21 06:17


ABG, PT/INR, D-dimer: 


ABG











ABG pH  7.436 pH Units (7.350-7.450)   06/12/21  18:56    


 


ABG pCO2  41.9 mm Hg  06/12/21  18:56    


 


ABG pO2  196.3 mm Hg (80.0-90.0)  H  06/12/21  18:56    


 


ABG O2 Saturation  99.2 % (95.0-99.0)  H  06/12/21  18:56    





PT/INR, D-dimer











PT  14.3 Sec. (12.2-14.9)   06/13/21  06:17    


 


INR  1.05  (0.87-1.13)   06/13/21  06:17    


 


D-Dimer  237.19 ng/mlDDU (0-234)  H  06/13/21  19:27    








Abnormal lab findings: 


                                  Abnormal Labs











  06/12/21 06/12/21 06/12/21





  18:45 18:45 18:56


 


WBC  16.3 H  


 


MCV  73 L  


 


MCH  23 L  


 


RDW  19.2 H  


 


Seg Neuts % (Manual)  94.0 H  


 


Lymphocytes % (Manual)  2.0 L  


 


Seg Neutrophils # Man  15.3 H  


 


Lymphocytes # (Manual)  0.3 L  


 


D-Dimer   


 


ABG pO2    196.3 H


 


ABG HCO3    27.6 H


 


ABG O2 Saturation    99.2 H


 


ABG Base Excess    3.1 H


 


ABG Hemoglobin    11.8 L


 


Potassium   3.2 L 


 


Glucose   108 H 


 


POC Glucose   


 


Lactate Dehydrogenase   


 


C-Reactive Protein   


 


NT-Pro-B Natriuret Pep   1203 H 














  06/12/21 06/13/21 06/13/21





  20:16 06:17 06:17


 


WBC   21.3 H 


 


MCV   73 L 


 


MCH   23 L 


 


RDW   19.5 H 


 


Seg Neuts % (Manual)   96.0 H 


 


Lymphocytes % (Manual)   1.0 L 


 


Seg Neutrophils # Man   20.4 H 


 


Lymphocytes # (Manual)   0.2 L 


 


D-Dimer   


 


ABG pO2   


 


ABG HCO3   


 


ABG O2 Saturation   


 


ABG Base Excess   


 


ABG Hemoglobin   


 


Potassium   


 


Glucose  112 H   129 H


 


POC Glucose   


 


Lactate Dehydrogenase  256 H  


 


C-Reactive Protein  6.50 H  


 


NT-Pro-B Natriuret Pep   














  06/13/21 06/13/21 06/13/21





  14:04 19:27 22:10


 


WBC   


 


MCV   


 


MCH   


 


RDW   


 


Seg Neuts % (Manual)   


 


Lymphocytes % (Manual)   


 


Seg Neutrophils # Man   


 


Lymphocytes # (Manual)   


 


D-Dimer   237.19 H 


 


ABG pO2   


 


ABG HCO3   


 


ABG O2 Saturation   


 


ABG Base Excess   


 


ABG Hemoglobin   


 


Potassium   


 


Glucose   


 


POC Glucose    113 H


 


Lactate Dehydrogenase  294 H  


 


C-Reactive Protein  5.50 H  


 


NT-Pro-B Natriuret Pep
Assessment and Plan





37 y/o obese female with acute respiratory failure, cardiomegaly, likely related

to CHF exacerbation.





6/16/21:  Most likely all respiratory issues are related to heart and poor 

function along with poor therapy.  Suggest cardiology consult and daily diuresis

to obtain net negative state.  Wean FiO2 for sats >88%.  Will sign off.  Patient

should be transferred to tele floor.





6/15/21:  Echo still not read yet.  Will give lasix 20mg IV now.  Suggest 

checking labs this am to follow up renal function and K.  Still feel patient is 

appropriate for transfer to floor, but likely needs tele as she most likely will

have cardiac abnormalities on echo.  If normal, med/surge would be fine.





1.  Follow up echo results


2.  Smoking cessation


3.  Lasix 20mg IV x1 now


4.  Weight loss


5.  Appears stable for transfer to Med/Surge





Subjective


Date of service: 06/16/21


Interval history: 


Patient refused bipap last night. Scanned in echo report from outside shows that

EF is 15-20%.  Not on lasix therapy. Not on proper heart failure regimen.





Objective


                               Vital Signs - 12hr











  06/15/21 06/16/21 06/16/21





  23:51 00:00 01:00


 


Temperature  98.9 F 


 


Pulse Rate  106 H 106 H


 


Pulse Rate [  105 H 





From Monitor]   


 


Respiratory 38 H 15 44 H





Rate   


 


Blood Pressure  128/80 138/75


 


O2 Sat by Pulse  96 95





Oximetry   














  06/16/21 06/16/21 06/16/21





  02:00 03:00 03:28


 


Temperature   98.8 F


 


Pulse Rate 102 H 97 H 


 


Pulse Rate [   





From Monitor]   


 


Respiratory 30 H 27 H 





Rate   


 


Blood Pressure 143/92 138/75 


 


O2 Sat by Pulse 92 95 





Oximetry   














  06/16/21 06/16/21 06/16/21





  04:00 05:00 06:00


 


Temperature   


 


Pulse Rate 104 H 97 H 104 H


 


Pulse Rate [ 104 H  





From Monitor]   


 


Respiratory 31 H 28 H 32 H





Rate   


 


Blood Pressure 125/86 117/65 117/65


 


O2 Sat by Pulse 88 89 91





Oximetry   














  06/16/21 06/16/21 06/16/21





  07:00 08:00 09:00


 


Temperature  97.9 F 


 


Pulse Rate 102 H 103 H 106 H


 


Pulse Rate [   





From Monitor]   


 


Respiratory 30 H 35 H 34 H





Rate   


 


Blood Pressure 141/91 148/106 136/78


 


O2 Sat by Pulse 95 96 97





Oximetry   














  06/16/21





  10:00


 


Temperature 


 


Pulse Rate 100 H


 


Pulse Rate [ 





From Monitor] 


 


Respiratory 20





Rate 


 


Blood Pressure 122/77


 


O2 Sat by Pulse 93





Oximetry 











Constitutional: no acute distress, alert


Eyes: non-icteric


ENT: oropharynx moist


Neck: supple


Effort: normal


Ascultation: Bilateral: clear (anteriorly)


Cardiovascular: other (sinus tachy, no mrg)


Gastrointestinal: normoactive bowel sounds, soft, non-tender, non-distended


Integumentary: normal


Extremities: no cyanosis, no edema, pink and warm


Neurologic: normal mental status, non-focal exam, pupils equal and round


Psychiatric: mood appropriate, affect normal


CBC and BMP: 


                                 06/16/21 07:03





                                 06/16/21 07:03


ABG, PT/INR, D-dimer: 


ABG











ABG pH  7.436 pH Units (7.350-7.450)   06/12/21  18:56    


 


ABG pCO2  41.9 mm Hg  06/12/21  18:56    


 


ABG pO2  196.3 mm Hg (80.0-90.0)  H  06/12/21  18:56    


 


ABG O2 Saturation  99.2 % (95.0-99.0)  H  06/12/21  18:56    





PT/INR, D-dimer











PT  14.3 Sec. (12.2-14.9)   06/13/21  06:17    


 


INR  1.05  (0.87-1.13)   06/13/21  06:17    


 


D-Dimer  237.19 ng/mlDDU (0-234)  H  06/13/21  19:27    








Abnormal lab findings: 


                                  Abnormal Labs











  06/12/21 06/12/21 06/12/21





  18:45 18:45 18:56


 


WBC  16.3 H  


 


MCV  73 L  


 


MCH  23 L  


 


RDW  19.2 H  


 


Seg Neuts % (Manual)  94.0 H  


 


Lymphocytes % (Manual)  2.0 L  


 


Seg Neutrophils # Man  15.3 H  


 


Lymphocytes # (Manual)  0.3 L  


 


D-Dimer   


 


ABG pO2    196.3 H


 


ABG HCO3    27.6 H


 


ABG O2 Saturation    99.2 H


 


ABG Base Excess    3.1 H


 


ABG Hemoglobin    11.8 L


 


Potassium   3.2 L 


 


Carbon Dioxide   


 


Glucose   108 H 


 


POC Glucose   


 


Lactate Dehydrogenase   


 


C-Reactive Protein   


 


NT-Pro-B Natriuret Pep   1203 H 














  06/12/21 06/13/21 06/13/21





  20:16 06:17 06:17


 


WBC   21.3 H 


 


MCV   73 L 


 


MCH   23 L 


 


RDW   19.5 H 


 


Seg Neuts % (Manual)   96.0 H 


 


Lymphocytes % (Manual)   1.0 L 


 


Seg Neutrophils # Man   20.4 H 


 


Lymphocytes # (Manual)   0.2 L 


 


D-Dimer   


 


ABG pO2   


 


ABG HCO3   


 


ABG O2 Saturation   


 


ABG Base Excess   


 


ABG Hemoglobin   


 


Potassium   


 


Carbon Dioxide   


 


Glucose  112 H   129 H


 


POC Glucose   


 


Lactate Dehydrogenase  256 H  


 


C-Reactive Protein  6.50 H  


 


NT-Pro-B Natriuret Pep   














  06/13/21 06/13/21 06/13/21





  14:04 19:27 22:10


 


WBC   


 


MCV   


 


MCH   


 


RDW   


 


Seg Neuts % (Manual)   


 


Lymphocytes % (Manual)   


 


Seg Neutrophils # Man   


 


Lymphocytes # (Manual)   


 


D-Dimer   237.19 H 


 


ABG pO2   


 


ABG HCO3   


 


ABG O2 Saturation   


 


ABG Base Excess   


 


ABG Hemoglobin   


 


Potassium   


 


Carbon Dioxide   


 


Glucose   


 


POC Glucose    113 H


 


Lactate Dehydrogenase  294 H  


 


C-Reactive Protein  5.50 H  


 


NT-Pro-B Natriuret Pep   














  06/15/21 06/15/21 06/16/21





  10:03 10:03 07:03


 


WBC  12.3 H  


 


MCV  73 L   73 L


 


MCH  23 L   23 L


 


RDW  19.4 H   19.7 H


 


Seg Neuts % (Manual)   


 


Lymphocytes % (Manual)   


 


Seg Neutrophils # Man   


 


Lymphocytes # (Manual)   


 


D-Dimer   


 


ABG pO2   


 


ABG HCO3   


 


ABG O2 Saturation   


 


ABG Base Excess   


 


ABG Hemoglobin   


 


Potassium   3.1 L D 


 


Carbon Dioxide   33 H 


 


Glucose   


 


POC Glucose   


 


Lactate Dehydrogenase   


 


C-Reactive Protein   


 


NT-Pro-B Natriuret Pep   














  06/16/21





  07:03


 


WBC 


 


MCV 


 


MCH 


 


RDW 


 


Seg Neuts % (Manual) 


 


Lymphocytes % (Manual) 


 


Seg Neutrophils # Man 


 


Lymphocytes # (Manual) 


 


D-Dimer 


 


ABG pO2 


 


ABG HCO3 


 


ABG O2 Saturation 


 


ABG Base Excess 


 


ABG Hemoglobin 


 


Potassium 


 


Carbon Dioxide  31 H


 


Glucose 


 


POC Glucose 


 


Lactate Dehydrogenase 


 


C-Reactive Protein 


 


NT-Pro-B Natriuret Pep
Assessment and Plan





Cultures:


SARS CoV2 PCR: Pending


6/12/2021 blood culture: In process





A/P:


36-year-old female with hypertension, CHF admitted to the hospital with 

shortness of breath, noted to be hypoxic.  She was also having some 

nonproductive cough.  She has not been vaccinated:





#SIRS/Sepsis: secondary to below.





#Bilateral pneumonia with acute hypoxic respiratory failure : Possibly viral in 

etiology however there was leukocytosis on admission.  Procalcitonin is low.  

Also possibly a component of CHF.





Recs:


-check for influenza and RSV


-Continue empiric antibiotics for now





DEIDRE Love MD


[facility] ([facility])


O: [phone]


F: [phone]





Subjective


Date of service: 06/14/21


Interval history: 





Afebrile, remains tachycardic.  On 15 L Venturi mask.  Covid PCR negative.





Objective





- Exam


Narrative Exam: 





Physical Exam: 


Constitutional: Alert, cooperative. No acute distress


Head, Ears, Nose: Normocephalic, atraumatic. 


Eyes: Conjunctivae/corneas clear. No icterus. No ptosis.


Neck: Supple, no meningeal signs


Oral: dentition fair, no thrush


Cardiovascular: S1, S2 normal. 


Respiratory: Good air entry, clear to auscultation bilaterally


GI: Soft, non-tender; bowel sounds normal. No peritoneal signs. 


Musculoskeletal: No pedal edema, no cyanosis.


Skin: No rash or abscess


Hem/Lymphatic: No palpable cervical or supraclavicular nodes. 


Psych: Mood ok. Affect normal


Neurological: Awake, alert, oriented. No gross abnormality





- Constitutional


Vitals: 


                                   Vital Signs











Temp Pulse Resp BP Pulse Ox


 


 97.9 F   120 H  48 H  148/82   84 


 


 06/14/21 08:18  06/14/21 12:51  06/14/21 12:51  06/14/21 12:51  06/14/21 12:51








                           Temperature -Last 24 Hours











Temperature                    97.9 F


 


Temperature                    98.8 F


 


Temperature                    98.5 F


 


Temperature                    98.4 F

















- Labs


CBC & Chem 7: 


                                 06/13/21 06:17





                                 06/13/21 06:17


Labs: 


                              Abnormal lab results











  06/13/21 06/13/21 06/13/21 Range/Units





  14:04 19:27 22:10 


 


D-Dimer   237.19 H   (0-234)  ng/mlDDU


 


POC Glucose    113 H  ()  mg/dL


 


Lactate Dehydrogenase  294 H    ()  units/L


 


C-Reactive Protein  5.50 H    (0.00-1.30)  mg/dL
Assessment and Plan





Cultures:


SARS CoV2 PCR: Pending


6/12/2021 blood culture: In process


Flu: negative





A/P:


36-year-old female with hypertension, CHF admitted to the hospital with 

shortness of breath, noted to be hypoxic.  She was also having some 

nonproductive cough.  She has not been vaccinated:





#SIRS/Sepsis: secondary to below.





#Bilateral pneumonia with acute hypoxic respiratory failure : Possibly viral in 

etiology however there was leukocytosis on admission.  Procalcitonin is low.  

Also possibly a component of CHF.





Recs:


-Completed empiric antibiotics. 





ID will sign off with the completion of antibiotics. please call with questions.







DEIDRE Love MD


[facility] ([facility])


O: [phone]


F: [phone]





Subjective


Date of service: 06/18/21


Principal diagnosis: Acute Hypoxic Respiratory Failure


Interval history: 





Afebrile, no other acute change. On venturi mask. 





Objective





- Exam


Narrative Exam: 





Physical Exam: 


Constitutional: Alert, cooperative. No acute distress


Head, Ears, Nose: Normocephalic, atraumatic. 


Eyes: Conjunctivae/corneas clear. No icterus. No ptosis.


Neck: Supple, no meningeal signs


Oral: dentition fair, no thrush


Cardiovascular: S1, S2 normal. 


Respiratory: Good air entry, clear to auscultation bilaterally


GI: Soft, non-tender; bowel sounds normal. No peritoneal signs. 


Musculoskeletal: No pedal edema, no cyanosis.


Skin: No rash or abscess


Hem/Lymphatic: No palpable cervical or supraclavicular nodes. 


Psych: Mood ok. Affect normal


Neurological: Awake, alert, oriented. No gross abnormality





- Constitutional


Vitals: 


                                   Vital Signs











Temp Pulse Resp BP Pulse Ox


 


 97.7 F   86   20   151/87   97 


 


 06/18/21 08:24  06/18/21 09:17  06/18/21 10:00  06/18/21 09:17  06/18/21 10:00








                           Temperature -Last 24 Hours











Temperature                    97.7 F


 


Temperature                    98.0 F


 


Temperature                    98.0 F


 


Temperature                    99.4 F


 


Temperature                    97.5 F

















- Labs


CBC & Chem 7: 


                                 06/17/21 04:41





                                 06/18/21 05:24


Labs: 


                              Abnormal lab results











  06/18/21 Range/Units





  05:24 


 


Albumin  3.4 L  (3.9-5)  g/dL


 


Triglycerides  159 H  (2-149)  mg/dL


 


HDL Cholesterol  35 L  (40-59)  mg/dL
Assessment and Plan





Cultures:


SARS CoV2 PCR: Pending


6/12/2021 blood culture: In process


Flu: negative





A/P:


36-year-old female with hypertension, CHF admitted to the hospital with 

shortness of breath, noted to be hypoxic.  She was also having some 

nonproductive cough.  She has not been vaccinated:





#SIRS/Sepsis: secondary to below.





#Bilateral pneumonia with acute hypoxic respiratory failure : Possibly viral in 

etiology however there was leukocytosis on admission.  Procalcitonin is low.  

Also possibly a component of CHF.





Recs:


-check for RSV


-Completed empiric antibiotics. 





DEIDRE Love MD


[facility] ([facility]


O: [phone]


F: [phone]





Subjective


Date of service: 06/17/21


Interval history: 





Afebrile, white count 11.9. on venturi mask. 





Imaging personally reviewed:


CXR: normal





Objective





- Exam


Narrative Exam: 





Physical Exam: 


Constitutional: Alert, cooperative. No acute distress


Head, Ears, Nose: Normocephalic, atraumatic. 


Eyes: Conjunctivae/corneas clear. No icterus. No ptosis.


Neck: Supple, no meningeal signs


Oral: dentition fair, no thrush


Cardiovascular: S1, S2 normal. 


Respiratory: Good air entry, clear to auscultation bilaterally


GI: Soft, non-tender; bowel sounds normal. No peritoneal signs. 


Musculoskeletal: No pedal edema, no cyanosis.


Skin: No rash or abscess


Hem/Lymphatic: No palpable cervical or supraclavicular nodes. 


Psych: Mood ok. Affect normal


Neurological: Awake, alert, oriented. No gross abnormality





- Constitutional


Vitals: 


                                   Vital Signs











Temp Pulse Resp BP Pulse Ox


 


 98.0 F   105 H  18   156/104   92 


 


 06/17/21 07:18  06/17/21 11:15  06/17/21 11:07  06/17/21 11:15  06/17/21 07:18








                           Temperature -Last 24 Hours











Temperature                    98.0 F


 


Temperature                    97.5 F


 


Temperature                    98.0 F


 


Temperature                    99.0 F


 


Temperature                    98.4 F

















- Labs


CBC & Chem 7: 


                                 06/17/21 04:41





                                 06/17/21 04:41


Labs: 


                              Abnormal lab results











  06/17/21 06/17/21 Range/Units





  04:41 04:41 


 


WBC  11.9 H   (4.5-11.0)  K/mm3


 


MCV  71 L   (79-97)  fl


 


MCH  24 L   (28-32)  pg


 


RDW  19.6 H   (13.2-15.2)  %


 


Potassium   3.2 L  (3.6-5.0)  mmol/L


 


Glucose   110 H  ()  mg/dL
Assessment and Plan





Cultures:


SARS CoV2 PCR: Pending


6/12/2021 blood culture: In process


Flu: negative





A/P:


36-year-old female with hypertension, CHF admitted to the hospital with 

shortness of breath, noted to be hypoxic.  She was also having some 

nonproductive cough.  She has not been vaccinated:





#SIRS/Sepsis: secondary to below.





#Bilateral pneumonia with acute hypoxic respiratory failure : Possibly viral in 

etiology however there was leukocytosis on admission.  Procalcitonin is low.  

Also possibly a component of CHF.





Recs:


-check for RSV


-Continue empiric antibiotics for now





DEIDRE Love MD


[facility] ([facility])


O: [phone]


F: [phone]





Subjective


Date of service: 06/15/21


Interval history: 





Afebrile white count 12.3.  Currently on Venturi mask





Objective





- Exam


Narrative Exam: 





Physical Exam: 


Constitutional: Alert, cooperative. No acute distress


Head, Ears, Nose: Normocephalic, atraumatic. 


Eyes: Conjunctivae/corneas clear. No icterus. No ptosis.


Neck: Supple, no meningeal signs


Oral: dentition fair, no thrush


Cardiovascular: S1, S2 normal. 


Respiratory: Good air entry, clear to auscultation bilaterally


GI: Soft, non-tender; bowel sounds normal. No peritoneal signs. 


Musculoskeletal: No pedal edema, no cyanosis.


Skin: No rash or abscess


Hem/Lymphatic: No palpable cervical or supraclavicular nodes. 


Psych: Mood ok. Affect normal


Neurological: Awake, alert, oriented. No gross abnormality





- Constitutional


Vitals: 


                                   Vital Signs











Temp Pulse Resp BP Pulse Ox


 


 99.0 F   110 H  37 H  118/76   86 


 


 06/15/21 12:00  06/15/21 11:00  06/15/21 11:00  06/15/21 11:00  06/15/21 10:00








                           Temperature -Last 24 Hours











Temperature                    99.0 F


 


Temperature                    99 F


 


Temperature                    98.2 F


 


Temperature                    97.8 F


 


Temperature                    97.8 F


 


Temperature                    97.8 F


 


Temperature                    97.9 F

















- Labs


CBC & Chem 7: 


                                 06/15/21 10:03





                                 06/15/21 10:03


Labs: 


                              Abnormal lab results











  06/15/21 06/15/21 Range/Units





  10:03 10:03 


 


WBC  12.3 H   (4.5-11.0)  K/mm3


 


MCV  73 L   (79-97)  fl


 


MCH  23 L   (28-32)  pg


 


RDW  19.4 H   (13.2-15.2)  %


 


Potassium   3.1 L D  (3.6-5.0)  mmol/L


 


Carbon Dioxide   33 H  (22-30)  mmol/L
Assessment and Plan





Cultures:


SARS CoV2 PCR: Pending


6/12/2021 blood culture: In process


Flu: negative





A/P:


36-year-old female with hypertension, CHF admitted to the hospital with 

shortness of breath, noted to be hypoxic.  She was also having some 

nonproductive cough.  She has not been vaccinated:





#SIRS/Sepsis: secondary to below.





#Bilateral pneumonia with acute hypoxic respiratory failure : Possibly viral in 

etiology however there was leukocytosis on admission.  Procalcitonin is low.  

Also possibly a component of CHF.





Recs:


-check for RSV


-Continue empiric antibiotics for now. Complete 5 days





DEIDRE Love MD


[facility]


O: [phone]


F: [phone]





Subjective


Date of service: 06/16/21


Interval history: 





Afebrile, normal whtie count. BiPAP/Venturi mask





Objective





- Exam


Narrative Exam: 





Physical Exam: 


Constitutional: Alert, cooperative. No acute distress


Head, Ears, Nose: Normocephalic, atraumatic. 


Eyes: Conjunctivae/corneas clear. No icterus. No ptosis.


Neck: Supple, no meningeal signs


Oral: dentition fair, no thrush


Cardiovascular: S1, S2 normal. 


Respiratory: Good air entry, clear to auscultation bilaterally


GI: Soft, non-tender; bowel sounds normal. No peritoneal signs. 


Musculoskeletal: No pedal edema, no cyanosis.


Skin: No rash or abscess


Hem/Lymphatic: No palpable cervical or supraclavicular nodes. 


Psych: Mood ok. Affect normal


Neurological: Awake, alert, oriented. No gross abnormality





- Constitutional


Vitals: 


                                   Vital Signs











Temp Pulse Resp BP Pulse Ox


 


 98.2 F   97 H  19   120/78   95 


 


 06/16/21 12:00  06/16/21 14:00  06/16/21 14:00  06/16/21 14:00  06/16/21 12:00








                           Temperature -Last 24 Hours











Temperature                    98.2 F


 


Temperature                    97.9 F


 


Temperature                    98.8 F


 


Temperature                    98.9 F


 


Temperature                    98.7 F


 


Temperature                    99.0 F

















- Labs


CBC & Chem 7: 


                                 06/16/21 07:03





                                 06/16/21 07:03


Labs: 


                              Abnormal lab results











  06/16/21 06/16/21 Range/Units





  07:03 07:03 


 


MCV  73 L   (79-97)  fl


 


MCH  23 L   (28-32)  pg


 


RDW  19.7 H   (13.2-15.2)  %


 


Carbon Dioxide   31 H  (22-30)  mmol/L
Assessment and Plan





Heart failure reduced ejection fraction in setting of dilated cardiomyopathy


* BNP was elevated on admission but is currently normal.  Patient is nearing 

  euvolemia, BLE edema trace to 1+.  Patient is on home diuretic regimen of 

  Lasix 80 mg twice daily. Convert Lasix to 80 mg p.o. daily.  Continue strict 

  I/O's.  


* Echocardiogram reviewed (6/13/2021) LVEF is 15 to 20%.  LV is severely 

  dilated.  LV SF is severely decreased.  Borderline LVH.  Left atrium is mildly

  dilated.  Right ventricle is mildly dilated.  Mild pulmonary hypertension RVSP

  is 40 mmHg.


* LVEF is severely decreased.  No previous records for comparison.  Patient is 

  followed by [place] [facility].  Records have been requested.


* Goal-directed therapy with cardioprotective regimen: ASA 81, Atorvastatin 40, 

  Increase Coreg to 12.5 twice daily, losartan 50.  





Acute respiratory failure with hypoxia


* Initial O2 sat 60s to 70s.  Requiring positive pressure assistance and 

  albuterol nebulized treatments.  Patient is currently maintaining O2 sats 

  without CPAP.


* Management per primary team





Elevated D-dimer


* CTA chest is negative for PTE.  Bilateral venous duplex ultrasound is negative

  for VTE.





DVT prophylaxis


* Lovenox SQ





Patient is currently in stable cardiac status.  Will follow





This patient was seen in conjunction with Dr. Lama who agrees with assessment

and plan of care.


   





- Patient Problems


(1) Acute and chronic respiratory failure with hypoxia


Current Visit: Yes   Status: Acute   





(2) Heart failure with reduced ejection fraction


Current Visit: Yes   Status: Acute   





(3) Dilated cardiomyopathy


Current Visit: Yes   Status: Chronic   





(4) Hypokalemia


Current Visit: Yes   Status: Acute   





(5) Elevated d-dimer


Current Visit: Yes   Status: Acute   





(6) Pneumonia


Current Visit: Yes   Status: Acute   


Qualifiers: 


   Pneumonia type: due to unspecified organism   Laterality: bilateral   Lung 

location: unspecified part of lung   Qualified Code(s): J18.9 - Pneumonia, 

unspecified organism   





(7) DVT prophylaxis


Current Visit: Yes   Status: Acute   





(8) Microcytic anemia


Current Visit: Yes   Status: Acute   





Subjective


Date of service: 06/18/21


Principal diagnosis: Acute Hypoxic Respiratory Failure


Interval history: 





Patient resting comfortably in bed.  No chest pain or shortness of breath 

overnight


Telemetry reviewed: Sinus rhythm 89 with a low of sinus bradycardia 45 

overnight.  No events





Objective


                                        


                                Last Vital Signs











Temp  97.7 F   06/18/21 08:24


 


Pulse  86   06/18/21 09:17


 


Resp  18   06/18/21 08:24


 


BP  151/87   06/18/21 09:17


 


Pulse Ox  91   06/18/21 08:24

















- Physical Examination


General: Appears Well


HEENT: Positive: PERRL, Normocephaly, Mucus Membranes Moist


Neck: Positive: neck supple, trachea midline


Cardiac: Positive: Reg Rate and Rhythm, S1/S2


Lungs: Positive: Decreased Breath Sounds


Neuro: Positive: Grossly Intact


Abdomen: Positive: Unremarkable, Soft


Skin: Negative: Rash, Wound


Extremities: Present: upper extr. pulses, lower extr. pulses, +1 Edema





- Labs and Meds


                                 Cardiac Enzymes











  06/18/21 Range/Units





  05:24 


 


AST  25  (5-40)  units/L








                                     Lipids











  06/18/21 Range/Units





  05:24 


 


Triglycerides  159 H  (2-149)  mg/dL


 


Cholesterol  147  ()  mg/dL


 


HDL Cholesterol  35 L  (40-59)  mg/dL


 


Cholesterol/HDL Ratio  4.20  %








                          Comprehensive Metabolic Panel











  06/18/21 Range/Units





  05:24 


 


Sodium  140  (137-145)  mmol/L


 


Potassium  3.6  (3.6-5.0)  mmol/L


 


Chloride  100.9  ()  mmol/L


 


Carbon Dioxide  29  (22-30)  mmol/L


 


BUN  9  (7-17)  mg/dL


 


Creatinine  0.7  (0.6-1.2)  mg/dL


 


Glucose  95  ()  mg/dL


 


Calcium  9.2  (8.4-10.2)  mg/dL


 


AST  25  (5-40)  units/L


 


ALT  27  (7-56)  units/L


 


Alkaline Phosphatase  75  ()  units/L


 


Total Protein  7.1  (6.3-8.2)  g/dL


 


Albumin  3.4 L  (3.9-5)  g/dL














- Imaging and Cardiology


EKG: report reviewed, image reviewed


Echo: report reviewed





- Telemetry


EKG Rhythm: Sinus Rhythm





- EKG


Sinus rhythms and dysrhythmias: sinus tachycardia
Assessment and Plan


Assessment and plan: 





Sepsis.  Present on admission.  Patient meets criteria given the leukocytosis, 

tachycardia and diagnosis of pneumonia.





Pneumonia.





Acute hypoxemic respiratory failure





Congestive heart failure





Hypokalemia





6/13/2021.  Patient currently with Venturi mask 15 L/min FiO2 50%.  Patient 

reportedly desaturated on nasal cannula to the 80s.  Pulmonary consultation 

pending.  Patient with normal lactic acid, ferritin and D-dimer.  However, CTA 

suggestive of multifocal opacities suggestive of viral pneumonia.  Potassium was

repleted.  Follow-up procalcitonin level.  Continue IV antibiotics.  ID 

consultation pending.





6/14/2021.  Covid testing found to be negative.  Continue empiric antibiotics 

per ID recommendations for now.  Await cardiology consultation for heart 

failure.  Echocardiogram also pending.





History


Interval history: 





No new issues overnight.





Hospitalist Physical





- Constitutional


Vitals: 


                                        











Temp Pulse Resp BP Pulse Ox


 


 97.9 F   119 H  35 H  155/89   94 


 


 06/14/21 08:18  06/14/21 04:00  06/14/21 04:00  06/14/21 01:07  06/14/21 07:52











General appearance: Present: mild distress, obese





- EENT


Eyes: Present: PERRL, EOM intact


ENT: hearing intact, clear oral mucosa, dentition normal





- Neck


Neck: Present: supple, normal ROM





- Respiratory


Respiratory effort: normal


Respiratory: bilateral: CTA





- Cardiovascular


Rhythm: regular


Heart Sounds: Present: S1 & S2.  Absent: gallop, rub





- Extremities


Extremities: no ischemia, No edema, Full ROM





- Abdominal


General gastrointestinal: soft, non-tender, non-distended, normal bowel sounds





- Integumentary


Integumentary: Present: clear, warm, dry





- Neurologic


Neurologic: CNII-XII intact, moves all extremities





HEART Score





- HEART Score


Troponin: 


                                        











Troponin T  < 0.010 ng/mL (0.00-0.029)   06/12/21  18:45    














Results





- Labs


CBC & Chem 7: 


                                 06/13/21 06:17





                                 06/13/21 06:17


Labs: 


                             Laboratory Last Values











WBC  21.3 K/mm3 (4.5-11.0)  H  06/13/21  06:17    


 


RBC  4.97 M/mm3 (3.65-5.03)   06/13/21  06:17    


 


Hgb  11.6 gm/dl (10.1-14.3)   06/13/21  06:17    


 


Hct  36.1 % (30.3-42.9)   06/13/21  06:17    


 


MCV  73 fl (79-97)  L  06/13/21  06:17    


 


MCH  23 pg (28-32)  L  06/13/21  06:17    


 


MCHC  32 % (30-34)   06/13/21  06:17    


 


RDW  19.5 % (13.2-15.2)  H  06/13/21  06:17    


 


Plt Count  335 K/mm3 (140-440)   06/13/21  06:17    


 


Add Manual Diff  Complete   06/13/21  06:17    


 


Total Counted  100   06/13/21  06:17    


 


Seg Neutrophils %  Np   06/13/21  06:17    


 


Seg Neuts % (Manual)  96.0 % (40.0-70.0)  H  06/13/21  06:17    


 


Band Neutrophils %  1.0 %  06/13/21  06:17    


 


Lymphocytes % (Manual)  1.0 % (13.4-35.0)  L  06/13/21  06:17    


 


Monocytes % (Manual)  2.0 % (0.0-7.3)   06/13/21  06:17    


 


Eosinophils % (Manual)  1.0 % (0.0-4.3)   06/12/21  18:45    


 


Nucleated RBC %  Not Reportable   06/13/21  06:17    


 


Seg Neutrophils # Man  20.4 K/mm3 (1.8-7.7)  H  06/13/21  06:17    


 


Band Neutrophils #  0.2 K/mm3  06/13/21  06:17    


 


Lymphocytes # (Manual)  0.2 K/mm3 (1.2-5.4)  L  06/13/21  06:17    


 


Abs React Lymphs (Man)  0.0 K/mm3  06/13/21  06:17    


 


Monocytes # (Manual)  0.4 K/mm3 (0.0-0.8)   06/13/21  06:17    


 


Eosinophils # (Manual)  0.0 K/mm3 (0.0-0.4)   06/13/21  06:17    


 


Basophils # (Manual)  0.0 K/mm3 (0.0-0.1)   06/13/21  06:17    


 


Metamyelocytes #  0.0 K/mm3  06/13/21  06:17    


 


Myelocytes #  0.0 K/mm3  06/13/21  06:17    


 


Promyelocytes #  0.0 K/mm3  06/13/21  06:17    


 


Blast Cells #  0.0 K/mm3  06/13/21  06:17    


 


WBC Morphology  Not Reportable   06/13/21  06:17    


 


Hypersegmented Neuts  Not Reportable   06/13/21  06:17    


 


Hyposegmented Neuts  Not Reportable   06/13/21  06:17    


 


Hypogranular Neuts  Not Reportable   06/13/21  06:17    


 


Smudge Cells  Not Reportable   06/13/21  06:17    


 


Toxic Granulation  Not Reportable   06/13/21  06:17    


 


Toxic Vacuolation  Not Reportable   06/13/21  06:17    


 


Dohle Bodies  Not Reportable   06/13/21  06:17    


 


Pelger-Huet Anomaly  Not Reportable   06/13/21  06:17    


 


Faisal Rods  Not Reportable   06/13/21  06:17    


 


Platelet Estimate  Consistent w auto   06/13/21  06:17    


 


Clumped Platelets  Not Reportable   06/13/21  06:17    


 


Plt Clumps, EDTA  Not Reportable   06/13/21  06:17    


 


Large Platelets  Not Reportable   06/13/21  06:17    


 


Giant Platelets  Not Reportable   06/13/21  06:17    


 


Platelet Satelliting  Not Reportable   06/13/21  06:17    


 


Plt Morphology Comment  Not Reportable   06/13/21  06:17    


 


RBC Morphology  Not Reportable   06/13/21  06:17    


 


Dimorphic RBCs  Not Reportable   06/13/21  06:17    


 


Polychromasia  Not Reportable   06/13/21  06:17    


 


Hypochromasia  Not Reportable   06/13/21  06:17    


 


Poikilocytosis  Not Reportable   06/13/21  06:17    


 


Anisocytosis  Not Reportable   06/13/21  06:17    


 


Microcytosis  Not Reportable   06/13/21  06:17    


 


Macrocytosis  Not Reportable   06/13/21  06:17    


 


Spherocytes  Not Reportable   06/13/21  06:17    


 


Pappenheimer Bodies  Not Reportable   06/13/21  06:17    


 


Sickle Cells  Not Reportable   06/13/21  06:17    


 


Target Cells  Few   06/13/21  06:17    


 


Tear Drop Cells  Not Reportable   06/13/21  06:17    


 


Ovalocytes  Not Reportable   06/13/21  06:17    


 


Helmet Cells  Not Reportable   06/13/21  06:17    


 


Lopez-Cannelton Bodies  Not Reportable   06/13/21  06:17    


 


Cabot Rings  Not Reportable   06/13/21  06:17    


 


Ross Cells  Not Reportable   06/13/21  06:17    


 


Bite Cells  Not Reportable   06/13/21  06:17    


 


Crenated Cell  Not Reportable   06/13/21  06:17    


 


Elliptocytes  Few   06/13/21  06:17    


 


Acanthocytes (Spur)  Not Reportable   06/13/21  06:17    


 


Rouleaux  Not Reportable   06/13/21  06:17    


 


Hemoglobin C Crystals  Not Reportable   06/13/21  06:17    


 


Schistocytes  Not Reportable   06/13/21  06:17    


 


Malaria parasites  Not Reportable   06/13/21  06:17    


 


Alex Bodies  Not Reportable   06/13/21  06:17    


 


Hem Pathologist Commnt  No   06/13/21  06:17    


 


PT  14.3 Sec. (12.2-14.9)   06/13/21  06:17    


 


INR  1.05  (0.87-1.13)   06/13/21  06:17    


 


APTT  25.1 Sec. (24.2-36.6)   06/12/21  18:45    


 


D-Dimer  237.19 ng/mlDDU (0-234)  H  06/13/21  19:27    


 


ABG pH  7.436 pH Units (7.350-7.450)   06/12/21  18:56    


 


ABG pCO2  41.9 mm Hg  06/12/21  18:56    


 


ABG pO2  196.3 mm Hg (80.0-90.0)  H  06/12/21  18:56    


 


ABG HCO3  27.6 mmol/L (20.0-26.0)  H  06/12/21  18:56    


 


ABG O2 Saturation  99.2 % (95.0-99.0)  H  06/12/21  18:56    


 


ABG O2 Content  16.4  (0.0-44)   06/12/21  18:56    


 


ABG Base Excess  3.1 mmol/L (-2.0-3.0)  H  06/12/21  18:56    


 


ABG Hemoglobin  11.8 gm/dl (12.0-16.0)  L  06/12/21  18:56    


 


ABG Carboxyhemoglobin  2.8 % (0.0-5.0)   06/12/21  18:56    


 


ABG Methemoglobin  0.5 % (0.0-1.5)   06/12/21  18:56    


 


Oxyhemoglobin  95.9 % (95.0-99.0)   06/12/21  18:56    


 


FiO2  21 %  06/12/21  18:56    


 


Sodium  143 mmol/L (137-145)   06/13/21  06:17    


 


Potassium  3.9 mmol/L (3.6-5.0)  D 06/13/21  06:17    


 


Chloride  102.6 mmol/L ()   06/13/21  06:17    


 


Carbon Dioxide  28 mmol/L (22-30)   06/13/21  06:17    


 


Anion Gap  16 mmol/L  06/13/21  06:17    


 


BUN  9 mg/dL (7-17)   06/13/21  06:17    


 


Creatinine  0.8 mg/dL (0.6-1.2)   06/13/21  06:17    


 


Estimated GFR  > 60 ml/min  06/13/21  06:17    


 


BUN/Creatinine Ratio  11 %  06/13/21  06:17    


 


Glucose  129 mg/dL ()  H  06/13/21  06:17    


 


POC Glucose  113 mg/dL ()  H  06/13/21  22:10    


 


Lactic Acid  0.90 mmol/L (0.7-2.0)   06/13/21  06:17    


 


Calcium  9.3 mg/dL (8.4-10.2)   06/13/21  06:17    


 


Ferritin  60.8 ng/mL (10.0-200.0)   06/13/21  14:04    


 


Lactate Dehydrogenase  294 units/L ()  H  06/13/21  14:04    


 


Troponin T  < 0.010 ng/mL (0.00-0.029)   06/12/21  18:45    


 


C-Reactive Protein  5.50 mg/dL (0.00-1.30)  H  06/13/21  14:04    


 


NT-Pro-B Natriuret Pep  1203 pg/mL (0-450)  H  06/12/21  18:45    


 


Procalcitonin  0.05 ng/mL (<0.15)   06/12/21  20:16    


 


HCG, Qual  Negative  (Negative)   06/12/21  18:45    


 


Coronavirus (PCR)  Negative  (Negative)   06/13/21  08:51    











Microbiology: 


Microbiology





06/12/21 20:25   Peripheral/Venous   Blood Culture - Preliminary


                            NO GROWTH AFTER 24 HOURS


06/12/21 20:16   Peripheral/Venous   Blood Culture - Preliminary


                            NO GROWTH AFTER 24 HOURS








Lockhart/IV: 


                                        





Voiding Method                   External Female Catheter











Active Medications





- Current Medications


Current Medications: 














Generic Name Dose Route Start Last Admin





  Trade Name Freq  PRN Reason Stop Dose Admin


 


Azithromycin  500 mg  06/14/21 10:00  06/14/21 09:46





  Azithromycin 250 Mg Tab  PO  06/17/21 10:01  500 mg





  QDAY [facility]   Administration





  Protocol  


 


Enoxaparin Sodium  40 mg  06/13/21 22:00  06/13/21 22:22





  Enoxaparin 40 Mg/0.4 Ml Inj  SUB-Q   40 mg





  QDAY@2200 [facility]   Administration





  Protocol  


 


Hydralazine HCl  10 mg  06/13/21 19:11 





  Hydralazine 20 Mg/1 Ml Inj  IV  





  Q4HR PRN  





  SBP > 170 or DBP > 100  


 


Ceftriaxone Sodium  2 gm in 100 mls @ 200 mls/hr  06/13/21 10:00  06/14/21 09:46





  Rocephin/Ns 2 Gm/100 Ml  IV   200 mls/hr





  Q24HR [facility]   Administration





  Protocol  


 


Magnesium Hydroxide  30 ml  06/12/21 23:15 





  Magnesium Hydroxide (Mom) Oral Liqd Udc  PO  





  Q4H PRN  





  Constipation  


 


Morphine Sulfate  2 mg  06/12/21 23:15  06/13/21 22:29





  Morphine 2 Mg/1 Ml Inj  IV   2 mg





  Q4H PRN   Administration





  Pain, Moderate (4-6)  


 


Ondansetron HCl  4 mg  06/12/21 23:15 





  Ondansetron 4 Mg/2 Ml Inj  IV  





  Q8H PRN  





  Nausea And Vomiting  


 


Sodium Chloride  10 ml  06/13/21 10:00  06/14/21 09:47





  Sodium Chloride 0.9% 10 Ml Flush Syringe  IV   10 ml





  BID ARMIDA   Administration


 


Sodium Chloride  10 ml  06/12/21 23:15 





  Sodium Chloride 0.9% 10 Ml Flush Syringe  IV  





  PRN PRN  





  LINE FLUSH
Assessment and Plan


Assessment and plan: 





Sepsis.  Present on admission.  Patient meets criteria given the leukocytosis, 

tachycardia and diagnosis of pneumonia.





Pneumonia.





Acute hypoxemic respiratory failure





Suspected COVID-19 infection





Congestive heart failure





Hypokalemia





6/13/2021.  Patient currently with Venturi mask 15 L/min FiO2 50%.  Patient 

reportedly desaturated on nasal cannula to the 80s.  Pulmonary consultation 

pending.  Patient with normal lactic acid, ferritin and D-dimer.  However, CTA 

suggestive of multifocal opacities suggestive of viral pneumonia.  Potassium was

repleted.  Follow-up procalcitonin level.  Continue IV antibiotics.  ID 

consultation pending.





History


Interval history: 





No new issues overnight.





Hospitalist Physical





- Constitutional


Vitals: 


                                        











Temp Pulse Resp BP Pulse Ox


 


 98.1 F   110 H  39 H  151/101   91 


 


 06/13/21 08:46  06/13/21 08:30  06/13/21 08:30  06/13/21 08:30  06/13/21 08:30











General appearance: Present: mild distress, obese





- EENT


Eyes: Present: PERRL, EOM intact


ENT: hearing intact, clear oral mucosa, dentition normal





- Neck


Neck: Present: supple, normal ROM





- Respiratory


Respiratory effort: normal


Respiratory: bilateral: CTA





- Cardiovascular


Rhythm: regular


Heart Sounds: Present: S1 & S2.  Absent: gallop, rub





- Extremities


Extremities: no ischemia, No edema, Full ROM





- Abdominal


General gastrointestinal: soft, non-tender, non-distended, normal bowel sounds





- Integumentary


Integumentary: Present: clear, warm, dry





- Neurologic


Neurologic: CNII-XII intact, moves all extremities





HEART Score





- HEART Score


Troponin: 


                                        











Troponin T  < 0.010 ng/mL (0.00-0.029)   06/12/21  18:45    














Results





- Labs


CBC & Chem 7: 


                                 06/13/21 06:17





                                 06/13/21 06:17


Labs: 


                             Laboratory Last Values











WBC  21.3 K/mm3 (4.5-11.0)  H  06/13/21  06:17    


 


RBC  4.97 M/mm3 (3.65-5.03)   06/13/21  06:17    


 


Hgb  11.6 gm/dl (10.1-14.3)   06/13/21  06:17    


 


Hct  36.1 % (30.3-42.9)   06/13/21  06:17    


 


MCV  73 fl (79-97)  L  06/13/21  06:17    


 


MCH  23 pg (28-32)  L  06/13/21  06:17    


 


MCHC  32 % (30-34)   06/13/21  06:17    


 


RDW  19.5 % (13.2-15.2)  H  06/13/21  06:17    


 


Plt Count  335 K/mm3 (140-440)   06/13/21  06:17    


 


Add Manual Diff  Complete   06/12/21  18:45    


 


Total Counted  100   06/12/21  18:45    


 


Seg Neutrophils %  Np   06/13/21  06:17    


 


Seg Neuts % (Manual)  94.0 % (40.0-70.0)  H  06/12/21  18:45    


 


Lymphocytes % (Manual)  2.0 % (13.4-35.0)  L  06/12/21  18:45    


 


Monocytes % (Manual)  3.0 % (0.0-7.3)   06/12/21  18:45    


 


Eosinophils % (Manual)  1.0 % (0.0-4.3)   06/12/21  18:45    


 


Nucleated RBC %  Not Reportable   06/12/21  18:45    


 


Seg Neutrophils # Man  15.3 K/mm3 (1.8-7.7)  H  06/12/21  18:45    


 


Band Neutrophils #  0.0 K/mm3  06/12/21  18:45    


 


Lymphocytes # (Manual)  0.3 K/mm3 (1.2-5.4)  L  06/12/21  18:45    


 


Abs React Lymphs (Man)  0.0 K/mm3  06/12/21  18:45    


 


Monocytes # (Manual)  0.5 K/mm3 (0.0-0.8)   06/12/21  18:45    


 


Eosinophils # (Manual)  0.2 K/mm3 (0.0-0.4)   06/12/21  18:45    


 


Basophils # (Manual)  0.0 K/mm3 (0.0-0.1)   06/12/21  18:45    


 


Metamyelocytes #  0.0 K/mm3  06/12/21  18:45    


 


Myelocytes #  0.0 K/mm3  06/12/21  18:45    


 


Promyelocytes #  0.0 K/mm3  06/12/21  18:45    


 


Blast Cells #  0.0 K/mm3  06/12/21  18:45    


 


WBC Morphology  Not Reportable   06/12/21  18:45    


 


Hypersegmented Neuts  Not Reportable   06/12/21  18:45    


 


Hyposegmented Neuts  Not Reportable   06/12/21  18:45    


 


Hypogranular Neuts  Not Reportable   06/12/21  18:45    


 


Smudge Cells  Not Reportable   06/12/21  18:45    


 


Toxic Granulation  Not Reportable   06/12/21  18:45    


 


Toxic Vacuolation  Not Reportable   06/12/21  18:45    


 


Dohle Bodies  Not Reportable   06/12/21  18:45    


 


Pelger-Huet Anomaly  Not Reportable   06/12/21  18:45    


 


Faisal Rods  Not Reportable   06/12/21  18:45    


 


Platelet Estimate  Consistent w auto   06/12/21  18:45    


 


Clumped Platelets  Not Reportable   06/12/21  18:45    


 


Plt Clumps, EDTA  Not Reportable   06/12/21  18:45    


 


Large Platelets  Not Reportable   06/12/21  18:45    


 


Giant Platelets  Not Reportable   06/12/21  18:45    


 


Platelet Satelliting  Not Reportable   06/12/21  18:45    


 


Plt Morphology Comment  Not Reportable   06/12/21  18:45    


 


RBC Morphology  Not Reportable   06/12/21  18:45    


 


Dimorphic RBCs  Not Reportable   06/12/21  18:45    


 


Polychromasia  Not Reportable   06/12/21  18:45    


 


Hypochromasia  1+   06/12/21  18:45    


 


Poikilocytosis  Not Reportable   06/12/21  18:45    


 


Anisocytosis  1+   06/12/21  18:45    


 


Microcytosis  Not Reportable   06/12/21  18:45    


 


Macrocytosis  Not Reportable   06/12/21  18:45    


 


Spherocytes  Not Reportable   06/12/21  18:45    


 


Pappenheimer Bodies  Not Reportable   06/12/21  18:45    


 


Sickle Cells  Not Reportable   06/12/21  18:45    


 


Target Cells  Not Reportable   06/12/21  18:45    


 


Tear Drop Cells  Not Reportable   06/12/21  18:45    


 


Ovalocytes  Not Reportable   06/12/21  18:45    


 


Helmet Cells  Not Reportable   06/12/21  18:45    


 


[place] Bodies  Not Reportable   06/12/21  18:45    


 


Cabot Rings  Not Reportable   06/12/21  18:45    


 


Crystal Cells  Not Reportable   06/12/21  18:45    


 


Bite Cells  Not Reportable   06/12/21  18:45    


 


Crenated Cell  Not Reportable   06/12/21  18:45    


 


Elliptocytes  Not Reportable   06/12/21  18:45    


 


Acanthocytes (Spur)  Not Reportable   06/12/21  18:45    


 


Rouleaux  Not Reportable   06/12/21  18:45    


 


Hemoglobin C Crystals  Not Reportable   06/12/21  18:45    


 


Schistocytes  Not Reportable   06/12/21  18:45    


 


Malaria parasites  Not Reportable   06/12/21  18:45    


 


Alex Bodies  Not Reportable   06/12/21  18:45    


 


Hem Pathologist Commnt  No   06/12/21  18:45    


 


PT  14.3 Sec. (12.2-14.9)   06/13/21  06:17    


 


INR  1.05  (0.87-1.13)   06/13/21  06:17    


 


APTT  25.1 Sec. (24.2-36.6)   06/12/21  18:45    


 


D-Dimer  167.40 ng/mlDDU (0-234)   06/12/21  20:16    


 


ABG pH  7.436 pH Units (7.350-7.450)   06/12/21  18:56    


 


ABG pCO2  41.9 mm Hg  06/12/21  18:56    


 


ABG pO2  196.3 mm Hg (80.0-90.0)  H  06/12/21  18:56    


 


ABG HCO3  27.6 mmol/L (20.0-26.0)  H  06/12/21  18:56    


 


ABG O2 Saturation  99.2 % (95.0-99.0)  H  06/12/21  18:56    


 


ABG O2 Content  16.4  (0.0-44)   06/12/21  18:56    


 


ABG Base Excess  3.1 mmol/L (-2.0-3.0)  H  06/12/21  18:56    


 


ABG Hemoglobin  11.8 gm/dl (12.0-16.0)  L  06/12/21  18:56    


 


ABG Carboxyhemoglobin  2.8 % (0.0-5.0)   06/12/21  18:56    


 


ABG Methemoglobin  0.5 % (0.0-1.5)   06/12/21  18:56    


 


Oxyhemoglobin  95.9 % (95.0-99.0)   06/12/21  18:56    


 


FiO2  21 %  06/12/21  18:56    


 


Sodium  143 mmol/L (137-145)   06/13/21  06:17    


 


Potassium  3.9 mmol/L (3.6-5.0)  D 06/13/21  06:17    


 


Chloride  102.6 mmol/L ()   06/13/21  06:17    


 


Carbon Dioxide  28 mmol/L (22-30)   06/13/21  06:17    


 


Anion Gap  16 mmol/L  06/13/21  06:17    


 


BUN  9 mg/dL (7-17)   06/13/21  06:17    


 


Creatinine  0.8 mg/dL (0.6-1.2)   06/13/21  06:17    


 


Estimated GFR  > 60 ml/min  06/13/21  06:17    


 


BUN/Creatinine Ratio  11 %  06/13/21  06:17    


 


Glucose  129 mg/dL ()  H  06/13/21  06:17    


 


Lactic Acid  0.90 mmol/L (0.7-2.0)   06/13/21  06:17    


 


Calcium  9.3 mg/dL (8.4-10.2)   06/13/21  06:17    


 


Ferritin  72.1 ng/mL (10.0-200.0)   06/12/21  20:16    


 


Lactate Dehydrogenase  256 units/L ()  H  06/12/21  20:16    


 


Troponin T  < 0.010 ng/mL (0.00-0.029)   06/12/21  18:45    


 


C-Reactive Protein  6.50 mg/dL (0.00-1.30)  H  06/12/21  20:16    


 


NT-Pro-B Natriuret Pep  1203 pg/mL (0-450)  H  06/12/21  18:45    


 


HCG, Qual  Negative  (Negative)   06/12/21  18:45    











Microbiology: 


Microbiology





06/12/21 20:16   Peripheral/Venous   Blood Culture - Preliminary


                            Culture in Progress


06/12/21 20:25   Peripheral/Venous   Blood Culture - Preliminary


                            Culture in Progress











Active Medications





- Current Medications


Current Medications: 














Generic Name Dose Route Start Last Admin





  Trade Name Freq  PRN Reason Stop Dose Admin


 


Enoxaparin Sodium  40 mg  06/13/21 22:00 





  Enoxaparin 40 Mg/0.4 Ml Inj  SUB-Q  





  QDAY@2200 [facility]  





  Protocol  


 


Ceftriaxone Sodium  2 gm in 100 mls @ 200 mls/hr  06/13/21 10:00 





  Rocephin/Ns 2 Gm/100 Ml  IV  





  Q24HR [facility]  





  Protocol  


 


Azithromycin  500 mg in 250 mls @ 250 mls/hr  06/13/21 10:00 





  Zithromax/Ns  IV  





  Q24HR [facility]  





  Protocol  


 


Magnesium Hydroxide  30 ml  06/12/21 23:15 





  Magnesium Hydroxide (Mom) Oral Liqd Udc  PO  





  Q4H PRN  





  Constipation  


 


Morphine Sulfate  2 mg  06/12/21 23:15 





  Morphine 2 Mg/1 Ml Inj  IV  





  Q4H PRN  





  Pain, Moderate (4-6)  


 


Ondansetron HCl  4 mg  06/12/21 23:15 





  Ondansetron 4 Mg/2 Ml Inj  IV  





  Q8H PRN  





  Nausea And Vomiting  


 


Sodium Chloride  10 ml  06/13/21 10:00 





  Sodium Chloride 0.9% 10 Ml Flush Syringe  IV  





  BID ARMIDA  


 


Sodium Chloride  10 ml  06/12/21 23:15 





  Sodium Chloride 0.9% 10 Ml Flush Syringe  IV  





  PRN PRN  





  LINE FLUSH
Assessment and Plan


Assessment and plan: 





Sepsis.  Present on admission.  Patient meets criteria given the leukocytosis, 

tachycardia and diagnosis of pneumonia.


Sepsis secondary to pneumonia





Acute hypoxemic respiratory failure due to CHF and bilateral pneumonia


supplemental Oxygen





Acute on chronic Congestive heart failure


Echo done report pending





Bilateral Pneumonia


ID Physician following


On Rocephin





Hypokalemia





6/13/2021.  Patient currently with Venturi mask 15 L/min FiO2 50%.  Patient 

reportedly desaturated on nasal cannula to the 80s.  Pulmonary consultation 

pending.  Patient with normal lactic acid, ferritin and D-dimer.  However, CTA 

suggestive of multifocal opacities suggestive of viral pneumonia.  Potassium was

repleted.  Follow-up procalcitonin level.  Continue IV antibiotics.  ID 

consultation pending.





6/14/2021.  Covid testing found to be negative.  Continue empiric antibiotics 

per ID recommendations for now.  Await cardiology consultation for heart 

failure.  Echocardiogram also pending.





6/15/21  Patient with acute resp failure. Etiology likely secondary to bilateral

pneumonia and possibly CHF. BNP is elevated. Will repeat BMP in am. 





6/16/21 Patient with acute respiratory failure due to acute on chronic CHF and 

bilateral pneumonia. ID Physician and Pulm following. Cardiology to see. She is 

improving. Now on Oxygen by NC. Was put on BIPAP on admission.








History


Interval history: 


Still has shortness of breath, but improved


She was on BIPAP, then ventimask, now on Oxygen by NC








Hospitalist Physical





- Physical exam


Narrative exam: 


Gen: Not in acute distress, lying in bed, Oxygen by NC


HEENT: Normochephalic, atraumatic


Neck:supple, No JVD


Lungs: Bilateral rales, 


Heart:S1 and S2 reg, no murmurs, rubs or gallop


Abd: soft, non tender, non distended, normal bowel sounds


Ext: No edema, no clubbing, no cyanosis


Neuro:Awake,alert,oriented X 3, moves all ext, no focal neurological signs











- Constitutional


Vitals: 


                                        











Temp Pulse Resp BP Pulse Ox


 


 97.9 F   100 H  20   122/77   93 


 


 06/16/21 08:00  06/16/21 10:00  06/16/21 10:00  06/16/21 10:00  06/16/21 10:00











General appearance: Present: obese





HEART Score





- HEART Score


Troponin: 


                                        











Troponin T  < 0.010 ng/mL (0.00-0.029)   06/12/21  18:45    














Results





- Labs


CBC & Chem 7: 


                                 06/16/21 07:03





                                 06/16/21 07:03


Labs: 


                             Laboratory Last Values











WBC  10.4 K/mm3 (4.5-11.0)   06/16/21  07:03    


 


RBC  4.56 M/mm3 (3.65-5.03)   06/16/21  07:03    


 


Hgb  10.4 gm/dl (10.1-14.3)   06/16/21  07:03    


 


Hct  33.3 % (30.3-42.9)   06/16/21  07:03    


 


MCV  73 fl (79-97)  L  06/16/21  07:03    


 


MCH  23 pg (28-32)  L  06/16/21  07:03    


 


MCHC  31 % (30-34)   06/16/21  07:03    


 


RDW  19.7 % (13.2-15.2)  H  06/16/21  07:03    


 


Plt Count  334 K/mm3 (140-440)   06/16/21  07:03    


 


Add Manual Diff  Complete   06/13/21  06:17    


 


Total Counted  100   06/13/21  06:17    


 


Seg Neutrophils %  Np   06/13/21  06:17    


 


Seg Neuts % (Manual)  96.0 % (40.0-70.0)  H  06/13/21  06:17    


 


Band Neutrophils %  1.0 %  06/13/21  06:17    


 


Lymphocytes % (Manual)  1.0 % (13.4-35.0)  L  06/13/21  06:17    


 


Monocytes % (Manual)  2.0 % (0.0-7.3)   06/13/21  06:17    


 


Eosinophils % (Manual)  1.0 % (0.0-4.3)   06/12/21  18:45    


 


Nucleated RBC %  Not Reportable   06/13/21  06:17    


 


Seg Neutrophils # Man  20.4 K/mm3 (1.8-7.7)  H  06/13/21  06:17    


 


Band Neutrophils #  0.2 K/mm3  06/13/21  06:17    


 


Lymphocytes # (Manual)  0.2 K/mm3 (1.2-5.4)  L  06/13/21  06:17    


 


Abs React Lymphs (Man)  0.0 K/mm3  06/13/21  06:17    


 


Monocytes # (Manual)  0.4 K/mm3 (0.0-0.8)   06/13/21  06:17    


 


Eosinophils # (Manual)  0.0 K/mm3 (0.0-0.4)   06/13/21  06:17    


 


Basophils # (Manual)  0.0 K/mm3 (0.0-0.1)   06/13/21  06:17    


 


Metamyelocytes #  0.0 K/mm3  06/13/21  06:17    


 


Myelocytes #  0.0 K/mm3  06/13/21  06:17    


 


Promyelocytes #  0.0 K/mm3  06/13/21  06:17    


 


Blast Cells #  0.0 K/mm3  06/13/21  06:17    


 


WBC Morphology  Not Reportable   06/13/21  06:17    


 


Hypersegmented Neuts  Not Reportable   06/13/21  06:17    


 


Hyposegmented Neuts  Not Reportable   06/13/21  06:17    


 


Hypogranular Neuts  Not Reportable   06/13/21  06:17    


 


Smudge Cells  Not Reportable   06/13/21  06:17    


 


Toxic Granulation  Not Reportable   06/13/21  06:17    


 


Toxic Vacuolation  Not Reportable   06/13/21  06:17    


 


Dohle Bodies  Not Reportable   06/13/21  06:17    


 


Pelger-Huet Anomaly  Not Reportable   06/13/21  06:17    


 


Faisal Rods  Not Reportable   06/13/21  06:17    


 


Platelet Estimate  Consistent w auto   06/13/21  06:17    


 


Clumped Platelets  Not Reportable   06/13/21  06:17    


 


Plt Clumps, EDTA  Not Reportable   06/13/21  06:17    


 


Large Platelets  Not Reportable   06/13/21  06:17    


 


Giant Platelets  Not Reportable   06/13/21  06:17    


 


Platelet Satelliting  Not Reportable   06/13/21  06:17    


 


Plt Morphology Comment  Not Reportable   06/13/21  06:17    


 


RBC Morphology  Not Reportable   06/13/21  06:17    


 


Dimorphic RBCs  Not Reportable   06/13/21  06:17    


 


Polychromasia  Not Reportable   06/13/21  06:17    


 


Hypochromasia  Not Reportable   06/13/21  06:17    


 


Poikilocytosis  Not Reportable   06/13/21  06:17    


 


Anisocytosis  Not Reportable   06/13/21  06:17    


 


Microcytosis  Not Reportable   06/13/21  06:17    


 


Macrocytosis  Not Reportable   06/13/21  06:17    


 


Spherocytes  Not Reportable   06/13/21  06:17    


 


Pappenheimer Bodies  Not Reportable   06/13/21  06:17    


 


Sickle Cells  Not Reportable   06/13/21  06:17    


 


Target Cells  Few   06/13/21  06:17    


 


Tear Drop Cells  Not Reportable   06/13/21  06:17    


 


Ovalocytes  Not Reportable   06/13/21  06:17    


 


Helmet Cells  Not Reportable   06/13/21  06:17    


 


[place] Bodies  Not Reportable   06/13/21  06:17    


 


Cabot Rings  Not Reportable   06/13/21  06:17    


 


[place] Cells  Not Reportable   06/13/21  06:17    


 


Bite Cells  Not Reportable   06/13/21  06:17    


 


Crenated Cell  Not Reportable   06/13/21  06:17    


 


Elliptocytes  Few   06/13/21  06:17    


 


Acanthocytes (Spur)  Not Reportable   06/13/21  06:17    


 


Rouleaux  Not Reportable   06/13/21  06:17    


 


Hemoglobin C Crystals  Not Reportable   06/13/21  06:17    


 


Schistocytes  Not Reportable   06/13/21  06:17    


 


Malaria parasites  Not Reportable   06/13/21  06:17    


 


Alex Bodies  Not Reportable   06/13/21  06:17    


 


Hem Pathologist Commnt  No   06/13/21  06:17    


 


PT  14.3 Sec. (12.2-14.9)   06/13/21  06:17    


 


INR  1.05  (0.87-1.13)   06/13/21  06:17    


 


APTT  25.1 Sec. (24.2-36.6)   06/12/21  18:45    


 


D-Dimer  237.19 ng/mlDDU (0-234)  H  06/13/21  19:27    


 


ABG pH  7.436 pH Units (7.350-7.450)   06/12/21  18:56    


 


ABG pCO2  41.9 mm Hg  06/12/21  18:56    


 


ABG pO2  196.3 mm Hg (80.0-90.0)  H  06/12/21  18:56    


 


ABG HCO3  27.6 mmol/L (20.0-26.0)  H  06/12/21  18:56    


 


ABG O2 Saturation  99.2 % (95.0-99.0)  H  06/12/21  18:56    


 


ABG O2 Content  16.4  (0.0-44)   06/12/21  18:56    


 


ABG Base Excess  3.1 mmol/L (-2.0-3.0)  H  06/12/21  18:56    


 


ABG Hemoglobin  11.8 gm/dl (12.0-16.0)  L  06/12/21  18:56    


 


ABG Carboxyhemoglobin  2.8 % (0.0-5.0)   06/12/21  18:56    


 


ABG Methemoglobin  0.5 % (0.0-1.5)   06/12/21  18:56    


 


Oxyhemoglobin  95.9 % (95.0-99.0)   06/12/21  18:56    


 


FiO2  21 %  06/12/21  18:56    


 


Sodium  142 mmol/L (137-145)   06/16/21  07:03    


 


Potassium  3.6 mmol/L (3.6-5.0)   06/16/21  07:03    


 


Chloride  102.1 mmol/L ()   06/16/21  07:03    


 


Carbon Dioxide  31 mmol/L (22-30)  H  06/16/21  07:03    


 


Anion Gap  13 mmol/L  06/16/21  07:03    


 


BUN  10 mg/dL (7-17)   06/16/21  07:03    


 


Creatinine  0.6 mg/dL (0.6-1.2)   06/16/21  07:03    


 


Estimated GFR  > 60 ml/min  06/16/21  07:03    


 


BUN/Creatinine Ratio  17 %  06/16/21  07:03    


 


Glucose  79 mg/dL ()   06/16/21  07:03    


 


POC Glucose  113 mg/dL ()  H  06/13/21  22:10    


 


Lactic Acid  0.90 mmol/L (0.7-2.0)   06/13/21  06:17    


 


Calcium  9.0 mg/dL (8.4-10.2)   06/16/21  07:03    


 


Ferritin  60.8 ng/mL (10.0-200.0)   06/13/21  14:04    


 


Lactate Dehydrogenase  294 units/L ()  H  06/13/21  14:04    


 


Troponin T  < 0.010 ng/mL (0.00-0.029)   06/12/21  18:45    


 


C-Reactive Protein  5.50 mg/dL (0.00-1.30)  H  06/13/21  14:04    


 


NT-Pro-B Natriuret Pep  223.3 pg/mL (0-450)   06/16/21  07:03    


 


Procalcitonin  0.05 ng/mL (<0.15)   06/12/21  20:16    


 


HCG, Qual  Negative  (Negative)   06/12/21  18:45    


 


Coronavirus (PCR)  Negative  (Negative)   06/13/21  08:51    


 


Influenza A (Rapid)  Negative  (Negative)   06/15/21  07:21    


 


Influenza B (Rapid)  Negative  (Negative)   06/15/21  07:21    











Microbiology: 


Microbiology





06/12/21 20:25   Peripheral/Venous   Blood Culture - Preliminary


                            NO GROWTH AFTER 72 HOURS


06/12/21 20:16   Peripheral/Venous   Blood Culture - Preliminary


                            NO GROWTH AFTER 72 HOURS








Lockhart/IV: 


                                        





Voiding Method                   External Female Catheter











Active Medications





- Current Medications


Current Medications: 














Generic Name Dose Route Start Last Admin





  Trade Name Freq  PRN Reason Stop Dose Admin


 


Acetaminophen  650 mg  06/15/21 22:27  06/15/21 23:51





  Acetaminophen 325 Mg Tab  PO   650 mg





  Q6H PRN   Administration





  Pain, Mild (1-3)  


 


Azithromycin  500 mg  06/14/21 10:00  06/16/21 09:14





  Azithromycin 250 Mg Tab  PO  06/17/21 10:01  500 mg





  QDAY [facility]   Administration





  Protocol  


 


Enoxaparin Sodium  40 mg  06/13/21 22:00  06/15/21 22:15





  Enoxaparin 40 Mg/0.4 Ml Inj  SUB-Q   40 mg





  QDAY@2200 [facility]   Administration





  Protocol  


 


Hydralazine HCl  10 mg  06/13/21 19:11 





  Hydralazine 20 Mg/1 Ml Inj  IV  





  Q4HR PRN  





  SBP > 170 or DBP > 100  


 


Ceftriaxone Sodium  2 gm in 100 mls @ 200 mls/hr  06/13/21 10:00  06/16/21 09:15





  Rocephin/Ns 2 Gm/100 Ml  IV   200 mls/hr





  Q24HR [facility]   Administration





  Protocol  


 


Magnesium Hydroxide  30 ml  06/12/21 23:15 





  Magnesium Hydroxide (Mom) Oral Liqd Udc  PO  





  Q4H PRN  





  Constipation  


 


Morphine Sulfate  2 mg  06/12/21 23:15  06/15/21 15:33





  Morphine 2 Mg/1 Ml Inj  IV   2 mg





  Q4H PRN   Administration





  Pain, Moderate (4-6)  


 


Ondansetron HCl  4 mg  06/12/21 23:15 





  Ondansetron 4 Mg/2 Ml Inj  IV  





  Q8H PRN  





  Nausea And Vomiting  


 


Sodium Chloride  10 ml  06/13/21 10:00  06/16/21 09:14





  Sodium Chloride 0.9% 10 Ml Flush Syringe  IV   10 ml





  BID ARMIDA   Administration


 


Sodium Chloride  10 ml  06/12/21 23:15 





  Sodium Chloride 0.9% 10 Ml Flush Syringe  IV  





  PRN PRN  





  LINE FLUSH  














Nutrition/Malnutrition Assess





- Dietary Evaluation


Nutrition/Malnutrition Findings: 


                                        





Nutrition Notes                                            Start:  06/14/21 12:1

0


Freq:                                                      Status: Active       




Protocol:                                                                       




 Document     06/14/21 12:11    (Rec: 06/14/21 12:13  DAPHNE  [id number])


 Nutrition Notes


     Need for Assessment generated from:         MD Order,Education


     Initial or Follow up                        Brief Note


     Current Diagnosis                           Sepsis,Hypertension,Heart


                                                 Failure,Respiratory Failure


     Other Pertinent Diagnosis                   pneu


     Current Diet                                Cardiac


     Subjective/Other Information                MD consult for diet education.


                                                 Pt reports eating fast food


                                                 regularly and eats high fat,


                                                 high salt foods at home. Pt


                                                 given CHF diet education and


                                                 encouraged to call with any


                                                 questions.


     #1


      Nutrition Diagnosis                        Food and nutrition-related


                                                 knowledge deficit


      Etiology                                   no prior CHF education


      As Evidenced by Signs and Symptoms         pt had questions about high


                                                 salt foods


 Nutrition Intervention


     Teaching Recipient                          Patient


     Learning Readiness                          Good


     Teaching Methods                            Discussion,Handout


     Response to Teaching                        Verbalize understanding


     Education Handouts Provided                 CHF Nutrition Therapy


     Barriers to Learning                        No Barriers


     RD phone number provided                    Yes


     Patient aware of follow up options          Yes


     Revisit per MD consult or patient           Sign Off


      request:
Assessment and Plan


Assessment and plan: 





Sepsis.  Present on admission.  Patient meets criteria given the leukocytosis, 

tachycardia and diagnosis of pneumonia.


Sepsis secondary to pneumonia





Acute hypoxemic respiratory failure due to CHF and bilateral pneumonia


supplemental Oxygen





Acute on chronic Congestive heart failure


Echo done report pending





Bilateral Pneumonia


ID Physician following


On Rocephin





Hypokalemia





6/13/2021.  Patient currently with Venturi mask 15 L/min FiO2 50%.  Patient 

reportedly desaturated on nasal cannula to the 80s.  Pulmonary consultation 

pending.  Patient with normal lactic acid, ferritin and D-dimer.  However, CTA 

suggestive of multifocal opacities suggestive of viral pneumonia.  Potassium was

repleted.  Follow-up procalcitonin level.  Continue IV antibiotics.  ID 

consultation pending.





6/14/2021.  Covid testing found to be negative.  Continue empiric antibiotics 

per ID recommendations for now.  Await cardiology consultation for heart 

failure.  Echocardiogram also pending.





6/15/21  Patient with acute resp failure. Etiology likely secondary to bilateral

pneumonia and possibly CHF. BNP is elevated. Will repeat BMP in am. 





6/16/21 Patient with acute respiratory failure due to acute on chronic CHF and 

bilateral pneumonia. ID Physician and Pulm following. Cardiology to see. She is 

improving. Now on Oxygen by [facility]. Was put on BIPAP on admission.





6/17/21 Patient with acute respiratory failure due to acute on chronic CHF and 

bilateral pneumonia. She is being followed by multiple specialists. She is now 

on ventimask at 15 l/min. Cardiology to evaluate. She has seen cardiologist at 

[place].


Repeat X Ray shows mild cardiomegaly, clear lungs.





6/18/21  Patient with acute resp failure. She is still on Oxygen by ventimask. 

patient has acute on chronic systolic heart failure and bilateral pneumonia. C

ardiology, Pulmonology and ID following. Will discuss with Resp Therapist about 

attempting to wean Oxygen.








History


Interval history: 


Still has shortness of breath, but improved


She was initially on BIPAP, then ventimask, then on Oxygen by [facility], now back to 

[facility]








Hospitalist Physical





- Physical exam


Narrative exam: 


Gen: Not in acute distress, lying in bed, Oxygen by ventimask


HEENT: Normochephalic, atraumatic


Neck:supple, No JVD


Lungs: Clear to auscultation, no rales, 


Heart:S1 and S2 reg, no murmurs, rubs or gallop


Abd: soft, non tender, non distended, normal bowel sounds


Ext: No edema, no clubbing, no cyanosis


Neuro:Awake,alert,oriented X 3, moves all ext, no focal neurological signs











- Constitutional


Vitals: 


                                        











Temp Pulse Resp BP Pulse Ox


 


 97.7 F   86   18   151/87   91 


 


 06/18/21 08:24  06/18/21 09:17  06/18/21 08:24  06/18/21 09:17  06/18/21 08:24











General appearance: Present: no acute distress





HEART Score





- HEART Score


Troponin: 


                                        











Troponin T  < 0.010 ng/mL (0.00-0.029)   06/17/21  04:41    














Results





- Labs


CBC & Chem 7: 


                                 06/17/21 04:41





                                 06/18/21 05:24


Labs: 


                             Laboratory Last Values











WBC  11.9 K/mm3 (4.5-11.0)  H  06/17/21  04:41    


 


RBC  4.35 M/mm3 (3.65-5.03)   06/17/21  04:41    


 


Hgb  10.6 gm/dl (10.1-14.3)   06/17/21  04:41    


 


Hct  31.0 % (30.3-42.9)   06/17/21  04:41    


 


MCV  71 fl (79-97)  L  06/17/21  04:41    


 


MCH  24 pg (28-32)  L  06/17/21  04:41    


 


MCHC  34 % (30-34)   06/17/21  04:41    


 


RDW  19.6 % (13.2-15.2)  H  06/17/21  04:41    


 


Plt Count  322 K/mm3 (140-440)   06/17/21  04:41    


 


Add Manual Diff  Complete   06/13/21  06:17    


 


Total Counted  100   06/13/21  06:17    


 


Seg Neutrophils %  Np   06/13/21  06:17    


 


Seg Neuts % (Manual)  96.0 % (40.0-70.0)  H  06/13/21  06:17    


 


Band Neutrophils %  1.0 %  06/13/21  06:17    


 


Lymphocytes % (Manual)  1.0 % (13.4-35.0)  L  06/13/21  06:17    


 


Monocytes % (Manual)  2.0 % (0.0-7.3)   06/13/21  06:17    


 


Eosinophils % (Manual)  1.0 % (0.0-4.3)   06/12/21  18:45    


 


Nucleated RBC %  Not Reportable   06/13/21  06:17    


 


Seg Neutrophils # Man  20.4 K/mm3 (1.8-7.7)  H  06/13/21  06:17    


 


Band Neutrophils #  0.2 K/mm3  06/13/21  06:17    


 


Lymphocytes # (Manual)  0.2 K/mm3 (1.2-5.4)  L  06/13/21  06:17    


 


Abs React Lymphs (Man)  0.0 K/mm3  06/13/21  06:17    


 


Monocytes # (Manual)  0.4 K/mm3 (0.0-0.8)   06/13/21  06:17    


 


Eosinophils # (Manual)  0.0 K/mm3 (0.0-0.4)   06/13/21  06:17    


 


Basophils # (Manual)  0.0 K/mm3 (0.0-0.1)   06/13/21  06:17    


 


Metamyelocytes #  0.0 K/mm3  06/13/21  06:17    


 


Myelocytes #  0.0 K/mm3  06/13/21  06:17    


 


Promyelocytes #  0.0 K/mm3  06/13/21  06:17    


 


Blast Cells #  0.0 K/mm3  06/13/21  06:17    


 


WBC Morphology  Not Reportable   06/13/21  06:17    


 


Hypersegmented Neuts  Not Reportable   06/13/21  06:17    


 


Hyposegmented Neuts  Not Reportable   06/13/21  06:17    


 


Hypogranular Neuts  Not Reportable   06/13/21  06:17    


 


Smudge Cells  Not Reportable   06/13/21  06:17    


 


Toxic Granulation  Not Reportable   06/13/21  06:17    


 


Toxic Vacuolation  Not Reportable   06/13/21  06:17    


 


Dohle Bodies  Not Reportable   06/13/21  06:17    


 


Pelger-Huet Anomaly  Not Reportable   06/13/21  06:17    


 


Faisal Rods  Not Reportable   06/13/21  06:17    


 


Platelet Estimate  Consistent w auto   06/13/21  06:17    


 


Clumped Platelets  Not Reportable   06/13/21  06:17    


 


Plt Clumps, EDTA  Not Reportable   06/13/21  06:17    


 


Large Platelets  Not Reportable   06/13/21  06:17    


 


Giant Platelets  Not Reportable   06/13/21  06:17    


 


Platelet Satelliting  Not Reportable   06/13/21  06:17    


 


Plt Morphology Comment  Not Reportable   06/13/21  06:17    


 


RBC Morphology  Not Reportable   06/13/21  06:17    


 


Dimorphic RBCs  Not Reportable   06/13/21  06:17    


 


Polychromasia  Not Reportable   06/13/21  06:17    


 


Hypochromasia  Not Reportable   06/13/21  06:17    


 


Poikilocytosis  Not Reportable   06/13/21  06:17    


 


Anisocytosis  Not Reportable   06/13/21  06:17    


 


Microcytosis  Not Reportable   06/13/21  06:17    


 


Macrocytosis  Not Reportable   06/13/21  06:17    


 


Spherocytes  Not Reportable   06/13/21  06:17    


 


Pappenheimer Bodies  Not Reportable   06/13/21  06:17    


 


Sickle Cells  Not Reportable   06/13/21  06:17    


 


Target Cells  Few   06/13/21  06:17    


 


Tear Drop Cells  Not Reportable   06/13/21  06:17    


 


Ovalocytes  Not Reportable   06/13/21  06:17    


 


Helmet Cells  Not Reportable   06/13/21  06:17    


 


Lopez-Casar Bodies  Not Reportable   06/13/21  06:17    


 


Cabot Rings  Not Reportable   06/13/21  06:17    


 


[place] Cells  Not Reportable   06/13/21  06:17    


 


Bite Cells  Not Reportable   06/13/21  06:17    


 


Crenated Cell  Not Reportable   06/13/21  06:17    


 


Elliptocytes  Few   06/13/21  06:17    


 


Acanthocytes (Spur)  Not Reportable   06/13/21  06:17    


 


Rouleaux  Not Reportable   06/13/21  06:17    


 


Hemoglobin C Crystals  Not Reportable   06/13/21  06:17    


 


Schistocytes  Not Reportable   06/13/21  06:17    


 


Malaria parasites  Not Reportable   06/13/21  06:17    


 


Alex Bodies  Not Reportable   06/13/21  06:17    


 


Hem Pathologist Commnt  No   06/13/21  06:17    


 


PT  14.3 Sec. (12.2-14.9)   06/13/21  06:17    


 


INR  1.05  (0.87-1.13)   06/13/21  06:17    


 


APTT  25.1 Sec. (24.2-36.6)   06/12/21  18:45    


 


D-Dimer  237.19 ng/mlDDU (0-234)  H  06/13/21  19:27    


 


ABG pH  7.436 pH Units (7.350-7.450)   06/12/21  18:56    


 


ABG pCO2  41.9 mm Hg  06/12/21  18:56    


 


ABG pO2  196.3 mm Hg (80.0-90.0)  H  06/12/21  18:56    


 


ABG HCO3  27.6 mmol/L (20.0-26.0)  H  06/12/21  18:56    


 


ABG O2 Saturation  99.2 % (95.0-99.0)  H  06/12/21  18:56    


 


ABG O2 Content  16.4  (0.0-44)   06/12/21  18:56    


 


ABG Base Excess  3.1 mmol/L (-2.0-3.0)  H  06/12/21  18:56    


 


ABG Hemoglobin  11.8 gm/dl (12.0-16.0)  L  06/12/21  18:56    


 


ABG Carboxyhemoglobin  2.8 % (0.0-5.0)   06/12/21  18:56    


 


ABG Methemoglobin  0.5 % (0.0-1.5)   06/12/21  18:56    


 


Oxyhemoglobin  95.9 % (95.0-99.0)   06/12/21  18:56    


 


FiO2  21 %  06/12/21  18:56    


 


Sodium  140 mmol/L (137-145)   06/18/21  05:24    


 


Potassium  3.6 mmol/L (3.6-5.0)   06/18/21  05:24    


 


Chloride  100.9 mmol/L ()   06/18/21  05:24    


 


Carbon Dioxide  29 mmol/L (22-30)   06/18/21  05:24    


 


Anion Gap  14 mmol/L  06/18/21  05:24    


 


BUN  9 mg/dL (7-17)   06/18/21  05:24    


 


Creatinine  0.7 mg/dL (0.6-1.2)   06/18/21  05:24    


 


Estimated GFR  > 60 ml/min  06/18/21  05:24    


 


BUN/Creatinine Ratio  13 %  06/18/21  05:24    


 


Glucose  95 mg/dL ()   06/18/21  05:24    


 


POC Glucose  113 mg/dL ()  H  06/13/21  22:10    


 


Lactic Acid  0.90 mmol/L (0.7-2.0)   06/13/21  06:17    


 


Calcium  9.2 mg/dL (8.4-10.2)   06/18/21  05:24    


 


Magnesium  2.00 mg/dL (1.7-2.3)   06/17/21  04:41    


 


Ferritin  60.8 ng/mL (10.0-200.0)   06/13/21  14:04    


 


Total Bilirubin  0.20 mg/dL (0.1-1.2)   06/18/21  05:24    


 


AST  25 units/L (5-40)   06/18/21  05:24    


 


ALT  27 units/L (7-56)   06/18/21  05:24    


 


Alkaline Phosphatase  75 units/L ()   06/18/21  05:24    


 


Lactate Dehydrogenase  294 units/L ()  H  06/13/21  14:04    


 


Troponin T  < 0.010 ng/mL (0.00-0.029)   06/17/21  04:41    


 


C-Reactive Protein  5.50 mg/dL (0.00-1.30)  H  06/13/21  14:04    


 


NT-Pro-B Natriuret Pep  223.3 pg/mL (0-450)   06/16/21  07:03    


 


Total Protein  7.1 g/dL (6.3-8.2)   06/18/21  05:24    


 


Albumin  3.4 g/dL (3.9-5)  L  06/18/21  05:24    


 


Albumin/Globulin Ratio  0.9 %  06/18/21  05:24    


 


Triglycerides  159 mg/dL (2-149)  H  06/18/21  05:24    


 


Cholesterol  147 mg/dL ()   06/18/21  05:24    


 


LDL Cholesterol Direct  88 mg/dL ()   06/18/21  05:24    


 


HDL Cholesterol  35 mg/dL (40-59)  L  06/18/21  05:24    


 


Cholesterol/HDL Ratio  4.20 %  06/18/21  05:24    


 


Procalcitonin  0.05 ng/mL (<0.15)   06/12/21  20:16    


 


HCG, Qual  Negative  (Negative)   06/12/21  18:45    


 


Coronavirus (PCR)  Negative  (Negative)   06/13/21  08:51    


 


Influenza A (Rapid)  Negative  (Negative)   06/15/21  07:21    


 


Influenza B (Rapid)  Negative  (Negative)   06/15/21  07:21    











Microbiology: 


Microbiology





06/12/21 20:25   Peripheral/Venous   Blood Culture - Final


                            NO GROWTH AFTER 5 DAYS


06/12/21 20:16   Peripheral/Venous   Blood Culture - Final


                            NO GROWTH AFTER 5 DAYS








[place]/IV: 


                                        





Voiding Method                   External Female Catheter











Active Medications





- Current Medications


Current Medications: 














Generic Name Dose Route Start Last Admin





  Trade Name Freq  PRN Reason Stop Dose Admin


 


Acetaminophen  650 mg  06/15/21 22:27  06/17/21 11:40





  Acetaminophen 325 Mg Tab  PO   650 mg





  Q6H PRN   Administration





  Pain, Mild (1-3)  


 


Aspirin  81 mg  06/17/21 11:00  06/18/21 09:17





  Aspirin 81 Mg Tab Chew  PO   81 mg





  QDAY ARMIDA   Administration


 


Atorvastatin Calcium  40 mg  06/17/21 22:00  06/17/21 21:35





  Atorvastatin 40 Mg Tab  PO   40 mg





  QHS ARMIDA   Administration


 


Carvedilol  12.5 mg  06/18/21 10:00  06/18/21 09:16





  Carvedilol 12.5 Mg Tab  PO   12.5 mg





  BID ARMIDA   Administration


 


Enoxaparin Sodium  40 mg  06/13/21 22:00  06/17/21 21:35





  Enoxaparin 40 Mg/0.4 Ml Inj  SUB-Q   40 mg





  QDAY@2200 ARMIDA   Administration





  Protocol  


 


Furosemide  40 mg  06/16/21 18:00  06/18/21 05:23





  Furosemide 40 Mg/4 Ml Inj  IV   40 mg





  0600,1800 ARMIDA   Administration


 


Hydralazine HCl  10 mg  06/13/21 19:11 





  Hydralazine 20 Mg/1 Ml Inj  IV  





  Q4HR PRN  





  SBP > 170 or DBP > 100  


 


Losartan Potassium  50 mg  06/18/21 10:00  06/18/21 09:17





  Losartan 50 Mg Tab  PO   50 mg





  QDAY ARMIDA   Administration


 


Magnesium Hydroxide  30 ml  06/12/21 23:15 





  Magnesium Hydroxide (Mom) Oral Liqd Udc  PO  





  Q4H PRN  





  Constipation  


 


Morphine Sulfate  2 mg  06/12/21 23:15  06/18/21 01:28





  Morphine 2 Mg/1 Ml Inj  IV   2 mg





  Q4H PRN   Administration





  Pain, Moderate (4-6)  


 


Ondansetron HCl  4 mg  06/12/21 23:15 





  Ondansetron 4 Mg/2 Ml Inj  IV  





  Q8H PRN  





  Nausea And Vomiting  


 


Sodium Chloride  10 ml  06/13/21 10:00  06/18/21 09:17





  Sodium Chloride 0.9% 10 Ml Flush Syringe  IV   10 ml





  BID ARMIDA   Administration


 


Sodium Chloride  10 ml  06/12/21 23:15 





  Sodium Chloride 0.9% 10 Ml Flush Syringe  IV  





  PRN PRN  





  LINE FLUSH  














Nutrition/Malnutrition Assess





- Dietary Evaluation


Nutrition/Malnutrition Findings: 


                                        





Nutrition Notes                                            Start:  06/14/21 

12:10


Freq:                                                      Status: Active       




Protocol:                                                                       




 Document     06/14/21 12:11    (Rec: 06/14/21 12:13    [id number])


 Nutrition Notes


     Need for Assessment generated from:         MD Order,Education


     Initial or Follow up                        Brief Note


     Current Diagnosis                           Sepsis,Hypertension,Heart


                                                 Failure,Respiratory Failure


     Other Pertinent Diagnosis                   pneu


     Current Diet                                Cardiac


     Subjective/Other Information                MD consult for diet education.


                                                 Pt reports eating fast food


                                                 regularly and eats high fat,


                                                 high salt foods at home. Pt


                                                 given CHF diet education and


                                                 encouraged to call with any


                                                 questions.


     #1


      Nutrition Diagnosis                        Food and nutrition-related


                                                 knowledge deficit


      Etiology                                   no prior CHF education


      As Evidenced by Signs and Symptoms         pt had questions about high


                                                 salt foods


 Nutrition Intervention


     Teaching Recipient                          Patient


     Learning Readiness                          Good


     Teaching Methods                            Discussion,Handout


     Response to Teaching                        Verbalize understanding


     Education Handouts Provided                 CHF Nutrition Therapy


     Barriers to Learning                        No Barriers


     RD phone number provided                    Yes


     Patient aware of follow up options          Yes


     Revisit per MD consult or patient           Sign Off


      request:
Assessment and Plan


Assessment and plan: 





Sepsis.  Present on admission.  Patient meets criteria given the leukocytosis, 

tachycardia and diagnosis of pneumonia.


Sepsis secondary to pneumonia





Acute hypoxemic respiratory failure due to CHF and bilateral pneumonia


supplemental Oxygen





Acute on chronic Congestive heart failure


Echo done report pending





Bilateral Pneumonia


ID Physician following


On Rocephin





Hypokalemia





6/13/2021.  Patient currently with Venturi mask 15 L/min FiO2 50%.  Patient 

reportedly desaturated on nasal cannula to the 80s.  Pulmonary consultation 

pending.  Patient with normal lactic acid, ferritin and D-dimer.  However, CTA 

suggestive of multifocal opacities suggestive of viral pneumonia.  Potassium was

repleted.  Follow-up procalcitonin level.  Continue IV antibiotics.  ID 

consultation pending.





6/14/2021.  Covid testing found to be negative.  Continue empiric antibiotics 

per ID recommendations for now.  Await cardiology consultation for heart 

failure.  Echocardiogram also pending.





6/15/21  Patient with acute resp failure. Etiology likely secondary to bilateral

pneumonia and possibly CHF. BNP is elevated. Will repeat BMP in am. 





6/16/21 Patient with acute respiratory failure due to acute on chronic CHF and 

bilateral pneumonia. ID Physician and Pulm following. Cardiology to see. She is 

improving. Now on Oxygen by [place]. Was put on BIPAP on admission.





6/17/21 Patient with acute respiratory failure due to acute on chronic CHF and 

bilateral pneumonia. She is being followed by multiple specialists. She is now 

on ventimask at 15 l/min. Cardiology to evaluate. She has seen cardiologist at 

[place].


Repeat X Ray shows mild cardiomegaly, clear lungs.








History


Interval history: 


Still has shortness of breath, but improved


She was initially on BIPAP, then ventimask, then on Oxygen by [place], now back to 

[facility]








Hospitalist Physical





- Physical exam


Narrative exam: 


Gen: Not in acute distress, lying in bed, Oxygen by ventimask


HEENT: Normochephalic, atraumatic


Neck:supple, No JVD


Lungs: Clear to auscultation, no rales, 


Heart:S1 and S2 reg, no murmurs, rubs or gallop


Abd: soft, non tender, non distended, normal bowel sounds


Ext: No edema, no clubbing, no cyanosis


Neuro:Awake,alert,oriented X 3, moves all ext, no focal neurological signs











- Constitutional


Vitals: 


                                        











Temp Pulse Resp BP Pulse Ox


 


 98.0 F   104 H  18   151/98   92 


 


 06/17/21 07:18  06/17/21 07:18  06/17/21 07:18  06/17/21 07:18  06/17/21 07:18











General appearance: Present: obese





HEART Score





- HEART Score


Troponin: 


                                        











Troponin T  < 0.010 ng/mL (0.00-0.029)   06/17/21  04:41    














Results





- Labs


CBC & Chem 7: 


                                 06/17/21 04:41





                                 06/17/21 04:41


Labs: 


                             Laboratory Last Values











WBC  11.9 K/mm3 (4.5-11.0)  H  06/17/21  04:41    


 


RBC  4.35 M/mm3 (3.65-5.03)   06/17/21  04:41    


 


Hgb  10.6 gm/dl (10.1-14.3)   06/17/21  04:41    


 


Hct  31.0 % (30.3-42.9)   06/17/21  04:41    


 


MCV  71 fl (79-97)  L  06/17/21  04:41    


 


MCH  24 pg (28-32)  L  06/17/21  04:41    


 


MCHC  34 % (30-34)   06/17/21  04:41    


 


RDW  19.6 % (13.2-15.2)  H  06/17/21  04:41    


 


Plt Count  322 K/mm3 (140-440)   06/17/21  04:41    


 


Add Manual Diff  Complete   06/13/21  06:17    


 


Total Counted  100   06/13/21  06:17    


 


Seg Neutrophils %  Np   06/13/21  06:17    


 


Seg Neuts % (Manual)  96.0 % (40.0-70.0)  H  06/13/21  06:17    


 


Band Neutrophils %  1.0 %  06/13/21  06:17    


 


Lymphocytes % (Manual)  1.0 % (13.4-35.0)  L  06/13/21  06:17    


 


Monocytes % (Manual)  2.0 % (0.0-7.3)   06/13/21  06:17    


 


Eosinophils % (Manual)  1.0 % (0.0-4.3)   06/12/21  18:45    


 


Nucleated RBC %  Not Reportable   06/13/21  06:17    


 


Seg Neutrophils # Man  20.4 K/mm3 (1.8-7.7)  H  06/13/21  06:17    


 


Band Neutrophils #  0.2 K/mm3  06/13/21  06:17    


 


Lymphocytes # (Manual)  0.2 K/mm3 (1.2-5.4)  L  06/13/21  06:17    


 


Abs React Lymphs (Man)  0.0 K/mm3  06/13/21  06:17    


 


Monocytes # (Manual)  0.4 K/mm3 (0.0-0.8)   06/13/21  06:17    


 


Eosinophils # (Manual)  0.0 K/mm3 (0.0-0.4)   06/13/21  06:17    


 


Basophils # (Manual)  0.0 K/mm3 (0.0-0.1)   06/13/21  06:17    


 


Metamyelocytes #  0.0 K/mm3  06/13/21  06:17    


 


Myelocytes #  0.0 K/mm3  06/13/21  06:17    


 


Promyelocytes #  0.0 K/mm3  06/13/21  06:17    


 


Blast Cells #  0.0 K/mm3  06/13/21  06:17    


 


WBC Morphology  Not Reportable   06/13/21  06:17    


 


Hypersegmented Neuts  Not Reportable   06/13/21  06:17    


 


Hyposegmented Neuts  Not Reportable   06/13/21  06:17    


 


Hypogranular Neuts  Not Reportable   06/13/21  06:17    


 


Smudge Cells  Not Reportable   06/13/21  06:17    


 


Toxic Granulation  Not Reportable   06/13/21  06:17    


 


Toxic Vacuolation  Not Reportable   06/13/21  06:17    


 


Dohle Bodies  Not Reportable   06/13/21  06:17    


 


Pelger-Huet Anomaly  Not Reportable   06/13/21  06:17    


 


Faisal Rods  Not Reportable   06/13/21  06:17    


 


Platelet Estimate  Consistent w auto   06/13/21  06:17    


 


Clumped Platelets  Not Reportable   06/13/21  06:17    


 


Plt Clumps, EDTA  Not Reportable   06/13/21  06:17    


 


Large Platelets  Not Reportable   06/13/21  06:17    


 


Giant Platelets  Not Reportable   06/13/21  06:17    


 


Platelet Satelliting  Not Reportable   06/13/21  06:17    


 


Plt Morphology Comment  Not Reportable   06/13/21  06:17    


 


RBC Morphology  Not Reportable   06/13/21  06:17    


 


Dimorphic RBCs  Not Reportable   06/13/21  06:17    


 


Polychromasia  Not Reportable   06/13/21  06:17    


 


Hypochromasia  Not Reportable   06/13/21  06:17    


 


Poikilocytosis  Not Reportable   06/13/21  06:17    


 


Anisocytosis  Not Reportable   06/13/21  06:17    


 


Microcytosis  Not Reportable   06/13/21  06:17    


 


Macrocytosis  Not Reportable   06/13/21  06:17    


 


Spherocytes  Not Reportable   06/13/21  06:17    


 


Pappenheimer Bodies  Not Reportable   06/13/21  06:17    


 


Sickle Cells  Not Reportable   06/13/21  06:17    


 


Target Cells  Few   06/13/21  06:17    


 


Tear Drop Cells  Not Reportable   06/13/21  06:17    


 


Ovalocytes  Not Reportable   06/13/21  06:17    


 


Helmet Cells  Not Reportable   06/13/21  06:17    


 


Lopez-[place] Bodies  Not Reportable   06/13/21  06:17    


 


Cabot Rings  Not Reportable   06/13/21  06:17    


 


[place] Cells  Not Reportable   06/13/21  06:17    


 


Bite Cells  Not Reportable   06/13/21  06:17    


 


Crenated Cell  Not Reportable   06/13/21  06:17    


 


Elliptocytes  Few   06/13/21  06:17    


 


Acanthocytes (Spur)  Not Reportable   06/13/21  06:17    


 


Rouleaux  Not Reportable   06/13/21  06:17    


 


Hemoglobin C Crystals  Not Reportable   06/13/21  06:17    


 


Schistocytes  Not Reportable   06/13/21  06:17    


 


Malaria parasites  Not Reportable   06/13/21  06:17    


 


Alex Bodies  Not Reportable   06/13/21  06:17    


 


Hem Pathologist Commnt  No   06/13/21  06:17    


 


PT  14.3 Sec. (12.2-14.9)   06/13/21  06:17    


 


INR  1.05  (0.87-1.13)   06/13/21  06:17    


 


APTT  25.1 Sec. (24.2-36.6)   06/12/21  18:45    


 


D-Dimer  237.19 ng/mlDDU (0-234)  H  06/13/21  19:27    


 


ABG pH  7.436 pH Units (7.350-7.450)   06/12/21  18:56    


 


ABG pCO2  41.9 mm Hg  06/12/21  18:56    


 


ABG pO2  196.3 mm Hg (80.0-90.0)  H  06/12/21  18:56    


 


ABG HCO3  27.6 mmol/L (20.0-26.0)  H  06/12/21  18:56    


 


ABG O2 Saturation  99.2 % (95.0-99.0)  H  06/12/21  18:56    


 


ABG O2 Content  16.4  (0.0-44)   06/12/21  18:56    


 


ABG Base Excess  3.1 mmol/L (-2.0-3.0)  H  06/12/21  18:56    


 


ABG Hemoglobin  11.8 gm/dl (12.0-16.0)  L  06/12/21  18:56    


 


ABG Carboxyhemoglobin  2.8 % (0.0-5.0)   06/12/21  18:56    


 


ABG Methemoglobin  0.5 % (0.0-1.5)   06/12/21  18:56    


 


Oxyhemoglobin  95.9 % (95.0-99.0)   06/12/21  18:56    


 


FiO2  21 %  06/12/21  18:56    


 


Sodium  139 mmol/L (137-145)   06/17/21  04:41    


 


Potassium  3.2 mmol/L (3.6-5.0)  L  06/17/21  04:41    


 


Chloride  99.9 mmol/L ()   06/17/21  04:41    


 


Carbon Dioxide  29 mmol/L (22-30)   06/17/21  04:41    


 


Anion Gap  13 mmol/L  06/17/21  04:41    


 


BUN  9 mg/dL (7-17)   06/17/21  04:41    


 


Creatinine  0.6 mg/dL (0.6-1.2)   06/17/21  04:41    


 


Estimated GFR  > 60 ml/min  06/17/21  04:41    


 


BUN/Creatinine Ratio  15 %  06/17/21  04:41    


 


Glucose  110 mg/dL ()  H  06/17/21  04:41    


 


POC Glucose  113 mg/dL ()  H  06/13/21  22:10    


 


Lactic Acid  0.90 mmol/L (0.7-2.0)   06/13/21  06:17    


 


Calcium  9.0 mg/dL (8.4-10.2)   06/17/21  04:41    


 


Magnesium  2.00 mg/dL (1.7-2.3)   06/17/21  04:41    


 


Ferritin  60.8 ng/mL (10.0-200.0)   06/13/21  14:04    


 


Lactate Dehydrogenase  294 units/L ()  H  06/13/21  14:04    


 


Troponin T  < 0.010 ng/mL (0.00-0.029)   06/17/21  04:41    


 


C-Reactive Protein  5.50 mg/dL (0.00-1.30)  H  06/13/21  14:04    


 


NT-Pro-B Natriuret Pep  223.3 pg/mL (0-450)   06/16/21  07:03    


 


Procalcitonin  0.05 ng/mL (<0.15)   06/12/21  20:16    


 


HCG, Qual  Negative  (Negative)   06/12/21  18:45    


 


Coronavirus (PCR)  Negative  (Negative)   06/13/21  08:51    


 


Influenza A (Rapid)  Negative  (Negative)   06/15/21  07:21    


 


Influenza B (Rapid)  Negative  (Negative)   06/15/21  07:21    











Microbiology: 


Microbiology





06/12/21 20:25   Peripheral/Venous   Blood Culture - Preliminary


                            NO GROWTH AFTER 4 DAYS


06/12/21 20:16   Peripheral/Venous   Blood Culture - Preliminary


                            NO GROWTH AFTER 4 DAYS








Lockhart/IV: 


                                        





Voiding Method                   Bedside Commode











Active Medications





- Current Medications


Current Medications: 














Generic Name Dose Route Start Last Admin





  Trade Name Freq  PRN Reason Stop Dose Admin


 


Acetaminophen  650 mg  06/15/21 22:27  06/17/21 01:47





  Acetaminophen 325 Mg Tab  PO   650 mg





  Q6H PRN   Administration





  Pain, Mild (1-3)  


 


Azithromycin  500 mg  06/14/21 10:00  06/16/21 09:14





  Azithromycin 250 Mg Tab  PO  06/17/21 10:01  500 mg





  QDAY [facility]   Administration





  Protocol  


 


Enoxaparin Sodium  40 mg  06/13/21 22:00  06/16/21 21:25





  Enoxaparin 40 Mg/0.4 Ml Inj  SUB-Q   40 mg





  QDAY@2200 [facility]   Administration





  Protocol  


 


Furosemide  40 mg  06/16/21 18:00  06/17/21 05:52





  Furosemide 40 Mg/4 Ml Inj  IV   40 mg





  0600,1800 [facility]   Administration


 


Hydralazine HCl  10 mg  06/13/21 19:11 





  Hydralazine 20 Mg/1 Ml Inj  IV  





  Q4HR PRN  





  SBP > 170 or DBP > 100  


 


Ceftriaxone Sodium  2 gm in 100 mls @ 200 mls/hr  06/13/21 10:00  06/16/21 21:29





  Rocephin/Ns 2 Gm/100 Ml  IV  06/17/21 10:29  Infused





  Q24HR [facility]   Infusion





  Protocol  


 


Magnesium Hydroxide  30 ml  06/12/21 23:15 





  Magnesium Hydroxide (Mom) Oral Liqd Udc  PO  





  Q4H PRN  





  Constipation  


 


Morphine Sulfate  2 mg  06/12/21 23:15  06/15/21 15:33





  Morphine 2 Mg/1 Ml Inj  IV   2 mg





  Q4H PRN   Administration





  Pain, Moderate (4-6)  


 


Ondansetron HCl  4 mg  06/12/21 23:15 





  Ondansetron 4 Mg/2 Ml Inj  IV  





  Q8H PRN  





  Nausea And Vomiting  


 


Potassium Chloride  40 meq  06/17/21 08:00 





  Potassium Chloride Er 20 Meq Tab  PO  06/17/21 12:01 





  Q4H ARMIDA  


 


Sodium Chloride  10 ml  06/13/21 10:00  06/16/21 21:26





  Sodium Chloride 0.9% 10 Ml Flush Syringe  IV   10 ml





  BID ARMIDA   Administration


 


Sodium Chloride  10 ml  06/12/21 23:15 





  Sodium Chloride 0.9% 10 Ml Flush Syringe  IV  





  PRN PRN  





  LINE FLUSH  














Nutrition/Malnutrition Assess





- Dietary Evaluation


Nutrition/Malnutrition Findings: 


                                        





Nutrition Notes                                            Start:  06/14/21 

12:10


Freq:                                                      Status: Active       




Protocol:                                                                       




 Document     06/14/21 12:11    (Rec: 06/14/21 12:13    [id number])


 Nutrition Notes


     Need for Assessment generated from:         MD Order,Education


     Initial or Follow up                        Brief Note


     Current Diagnosis                           Sepsis,Hypertension,Heart


                                                 Failure,Respiratory Failure


     Other Pertinent Diagnosis                   pneu


     Current Diet                                Cardiac


     Subjective/Other Information                MD consult for diet education.


                                                 Pt reports eating fast food


                                                 regularly and eats high fat,


                                                 high salt foods at home. Pt


                                                 given CHF diet education and


                                                 encouraged to call with any


                                                 questions.


     #1


      Nutrition Diagnosis                        Food and nutrition-related


                                                 knowledge deficit


      Etiology                                   no prior CHF education


      As Evidenced by Signs and Symptoms         pt had questions about high


                                                 salt foods


 Nutrition Intervention


     Teaching Recipient                          Patient


     Learning Readiness                          Good


     Teaching Methods                            Discussion,Handout


     Response to Teaching                        Verbalize understanding


     Education Handouts Provided                 CHF Nutrition Therapy


     Barriers to Learning                        No Barriers


     RD phone number provided                    Yes


     Patient aware of follow up options          Yes


     Revisit per MD consult or patient           Sign Off


      request:
Assessment and Plan


Assessment and plan: 





Sepsis.  Present on admission.  Patient meets criteria given the leukocytosis, 

tachycardia and diagnosis of pneumonia.


Sepsis secondary to pneumonia





Pneumonia.





Acute hypoxemic respiratory failure





Congestive heart failure





Hypokalemia





6/13/2021.  Patient currently with Venturi mask 15 L/min FiO2 50%.  Patient 

reportedly desaturated on nasal cannula to the 80s.  Pulmonary consultation 

pending.  Patient with normal lactic acid, ferritin and D-dimer.  However, CTA 

suggestive of multifocal opacities suggestive of viral pneumonia.  Potassium was

repleted.  Follow-up procalcitonin level.  Continue IV antibiotics.  ID 

consultation pending.





6/14/2021.  Covid testing found to be negative.  Continue empiric antibiotics 

per ID recommendations for now.  Await cardiology consultation for heart 

failure.  Echocardiogram also pending.





6/15/21  Patient with acute resp failure. Etiology likely secondary to bilateral

pneumonia and possibly CHF. BNP is elevated. Will repeat BMP in am. 








History


Interval history: 


Still has shortness of breath, but improved


She was on BIPAP, now on ventimask








Hospitalist Physical





- Physical exam


Narrative exam: 


Gen: Not in acute distress, lying in bed, ventimask on


HEENT: Normochephalic, atraumatic


Neck:supple, No JVD


Lungs: Bilateral rales, 


Heart:S1 and S2 reg, no murmurs, rubs or gallop


Abd: soft, non tender, non distended, normal bowel sounds


Ext: No edema, no clubbing, no cyanosis


Neuro:Awake,alert,oriented X 3, moves all ext, no focal neurological signs











- Constitutional


Vitals: 


                                        











Temp Pulse Resp BP Pulse Ox


 


 99 F   110 H  37 H  118/76   86 


 


 06/15/21 08:00  06/15/21 11:00  06/15/21 11:00  06/15/21 11:00  06/15/21 10:00











General appearance: Present: mild distress, obese





HEART Score





- HEART Score


Troponin: 


                                        











Troponin T  < 0.010 ng/mL (0.00-0.029)   06/12/21  18:45    














Results





- Labs


CBC & Chem 7: 


                                 06/15/21 10:03





                                 06/15/21 10:03


Labs: 


                             Laboratory Last Values











WBC  12.3 K/mm3 (4.5-11.0)  H  06/15/21  10:03    


 


RBC  4.46 M/mm3 (3.65-5.03)   06/15/21  10:03    


 


Hgb  10.4 gm/dl (10.1-14.3)   06/15/21  10:03    


 


Hct  32.4 % (30.3-42.9)   06/15/21  10:03    


 


MCV  73 fl (79-97)  L  06/15/21  10:03    


 


MCH  23 pg (28-32)  L  06/15/21  10:03    


 


MCHC  32 % (30-34)   06/15/21  10:03    


 


RDW  19.4 % (13.2-15.2)  H  06/15/21  10:03    


 


Plt Count  298 K/mm3 (140-440)   06/15/21  10:03    


 


Add Manual Diff  Complete   06/13/21  06:17    


 


Total Counted  100   06/13/21  06:17    


 


Seg Neutrophils %  Np   06/13/21  06:17    


 


Seg Neuts % (Manual)  96.0 % (40.0-70.0)  H  06/13/21  06:17    


 


Band Neutrophils %  1.0 %  06/13/21  06:17    


 


Lymphocytes % (Manual)  1.0 % (13.4-35.0)  L  06/13/21  06:17    


 


Monocytes % (Manual)  2.0 % (0.0-7.3)   06/13/21  06:17    


 


Eosinophils % (Manual)  1.0 % (0.0-4.3)   06/12/21  18:45    


 


Nucleated RBC %  Not Reportable   06/13/21  06:17    


 


Seg Neutrophils # Man  20.4 K/mm3 (1.8-7.7)  H  06/13/21  06:17    


 


Band Neutrophils #  0.2 K/mm3  06/13/21  06:17    


 


Lymphocytes # (Manual)  0.2 K/mm3 (1.2-5.4)  L  06/13/21  06:17    


 


Abs React Lymphs (Man)  0.0 K/mm3  06/13/21  06:17    


 


Monocytes # (Manual)  0.4 K/mm3 (0.0-0.8)   06/13/21  06:17    


 


Eosinophils # (Manual)  0.0 K/mm3 (0.0-0.4)   06/13/21  06:17    


 


Basophils # (Manual)  0.0 K/mm3 (0.0-0.1)   06/13/21  06:17    


 


Metamyelocytes #  0.0 K/mm3  06/13/21  06:17    


 


Myelocytes #  0.0 K/mm3  06/13/21  06:17    


 


Promyelocytes #  0.0 K/mm3  06/13/21  06:17    


 


Blast Cells #  0.0 K/mm3  06/13/21  06:17    


 


WBC Morphology  Not Reportable   06/13/21  06:17    


 


Hypersegmented Neuts  Not Reportable   06/13/21  06:17    


 


Hyposegmented Neuts  Not Reportable   06/13/21  06:17    


 


Hypogranular Neuts  Not Reportable   06/13/21  06:17    


 


Smudge Cells  Not Reportable   06/13/21  06:17    


 


Toxic Granulation  Not Reportable   06/13/21  06:17    


 


Toxic Vacuolation  Not Reportable   06/13/21  06:17    


 


Dohle Bodies  Not Reportable   06/13/21  06:17    


 


Pelger-Huet Anomaly  Not Reportable   06/13/21  06:17    


 


Faisal Rods  Not Reportable   06/13/21  06:17    


 


Platelet Estimate  Consistent w auto   06/13/21  06:17    


 


Clumped Platelets  Not Reportable   06/13/21  06:17    


 


Plt Clumps, EDTA  Not Reportable   06/13/21  06:17    


 


Large Platelets  Not Reportable   06/13/21  06:17    


 


Giant Platelets  Not Reportable   06/13/21  06:17    


 


Platelet Satelliting  Not Reportable   06/13/21  06:17    


 


Plt Morphology Comment  Not Reportable   06/13/21  06:17    


 


RBC Morphology  Not Reportable   06/13/21  06:17    


 


Dimorphic RBCs  Not Reportable   06/13/21  06:17    


 


Polychromasia  Not Reportable   06/13/21  06:17    


 


Hypochromasia  Not Reportable   06/13/21  06:17    


 


Poikilocytosis  Not Reportable   06/13/21  06:17    


 


Anisocytosis  Not Reportable   06/13/21  06:17    


 


Microcytosis  Not Reportable   06/13/21  06:17    


 


Macrocytosis  Not Reportable   06/13/21  06:17    


 


Spherocytes  Not Reportable   06/13/21  06:17    


 


Pappenheimer Bodies  Not Reportable   06/13/21  06:17    


 


Sickle Cells  Not Reportable   06/13/21  06:17    


 


Target Cells  Few   06/13/21  06:17    


 


Tear Drop Cells  Not Reportable   06/13/21  06:17    


 


Ovalocytes  Not Reportable   06/13/21  06:17    


 


Helmet Cells  Not Reportable   06/13/21  06:17    


 


Lopez-Bovina Bodies  Not Reportable   06/13/21  06:17    


 


Cabot Rings  Not Reportable   06/13/21  06:17    


 


Denton Cells  Not Reportable   06/13/21  06:17    


 


Bite Cells  Not Reportable   06/13/21  06:17    


 


Crenated Cell  Not Reportable   06/13/21  06:17    


 


Elliptocytes  Few   06/13/21  06:17    


 


Acanthocytes (Spur)  Not Reportable   06/13/21  06:17    


 


Rouleaux  Not Reportable   06/13/21  06:17    


 


Hemoglobin C Crystals  Not Reportable   06/13/21  06:17    


 


Schistocytes  Not Reportable   06/13/21  06:17    


 


Malaria parasites  Not Reportable   06/13/21  06:17    


 


Alex Bodies  Not Reportable   06/13/21  06:17    


 


Hem Pathologist Commnt  No   06/13/21  06:17    


 


PT  14.3 Sec. (12.2-14.9)   06/13/21  06:17    


 


INR  1.05  (0.87-1.13)   06/13/21  06:17    


 


APTT  25.1 Sec. (24.2-36.6)   06/12/21  18:45    


 


D-Dimer  237.19 ng/mlDDU (0-234)  H  06/13/21  19:27    


 


ABG pH  7.436 pH Units (7.350-7.450)   06/12/21  18:56    


 


ABG pCO2  41.9 mm Hg  06/12/21  18:56    


 


ABG pO2  196.3 mm Hg (80.0-90.0)  H  06/12/21  18:56    


 


ABG HCO3  27.6 mmol/L (20.0-26.0)  H  06/12/21  18:56    


 


ABG O2 Saturation  99.2 % (95.0-99.0)  H  06/12/21  18:56    


 


ABG O2 Content  16.4  (0.0-44)   06/12/21  18:56    


 


ABG Base Excess  3.1 mmol/L (-2.0-3.0)  H  06/12/21  18:56    


 


ABG Hemoglobin  11.8 gm/dl (12.0-16.0)  L  06/12/21  18:56    


 


ABG Carboxyhemoglobin  2.8 % (0.0-5.0)   06/12/21  18:56    


 


ABG Methemoglobin  0.5 % (0.0-1.5)   06/12/21  18:56    


 


Oxyhemoglobin  95.9 % (95.0-99.0)   06/12/21  18:56    


 


FiO2  21 %  06/12/21  18:56    


 


Sodium  139 mmol/L (137-145)   06/15/21  10:03    


 


Potassium  3.1 mmol/L (3.6-5.0)  L D 06/15/21  10:03    


 


Chloride  98.6 mmol/L ()   06/15/21  10:03    


 


Carbon Dioxide  33 mmol/L (22-30)  H  06/15/21  10:03    


 


Anion Gap  11 mmol/L  06/15/21  10:03    


 


BUN  12 mg/dL (7-17)   06/15/21  10:03    


 


Creatinine  0.7 mg/dL (0.6-1.2)   06/15/21  10:03    


 


Estimated GFR  > 60 ml/min  06/15/21  10:03    


 


BUN/Creatinine Ratio  17 %  06/15/21  10:03    


 


Glucose  96 mg/dL ()   06/15/21  10:03    


 


POC Glucose  113 mg/dL ()  H  06/13/21  22:10    


 


Lactic Acid  0.90 mmol/L (0.7-2.0)   06/13/21  06:17    


 


Calcium  8.9 mg/dL (8.4-10.2)   06/15/21  10:03    


 


Ferritin  60.8 ng/mL (10.0-200.0)   06/13/21  14:04    


 


Lactate Dehydrogenase  294 units/L ()  H  06/13/21  14:04    


 


Troponin T  < 0.010 ng/mL (0.00-0.029)   06/12/21  18:45    


 


C-Reactive Protein  5.50 mg/dL (0.00-1.30)  H  06/13/21  14:04    


 


NT-Pro-B Natriuret Pep  1203 pg/mL (0-450)  H  06/12/21  18:45    


 


Procalcitonin  0.05 ng/mL (<0.15)   06/12/21  20:16    


 


HCG, Qual  Negative  (Negative)   06/12/21  18:45    


 


Coronavirus (PCR)  Negative  (Negative)   06/13/21  08:51    











Microbiology: 


Microbiology





06/12/21 20:25   Peripheral/Venous   Blood Culture - Preliminary


                            NO GROWTH AFTER 48 HOURS


06/12/21 20:16   Peripheral/Venous   Blood Culture - Preliminary


                            NO GROWTH AFTER 48 HOURS








Lockhart/IV: 


                                        





Voiding Method                   External Female Catheter











Active Medications





- Current Medications


Current Medications: 














Generic Name Dose Route Start Last Admin





  Trade Name Freq  PRN Reason Stop Dose Admin


 


Azithromycin  500 mg  06/14/21 10:00  06/15/21 09:02





  Azithromycin 250 Mg Tab  PO  06/17/21 10:01  500 mg





  [facility]   Administration





  Protocol  


 


Enoxaparin Sodium  40 mg  06/13/21 22:00  06/14/21 21:23





  Enoxaparin 40 Mg/0.4 Ml Inj  SUB-Q   40 mg





  [facility]2200 [facility]   Administration





  Protocol  


 


Hydralazine HCl  10 mg  06/13/21 19:11 





  Hydralazine 20 Mg/1 Ml Inj  IV  





  Q4HR PRN  





  SBP > 170 or DBP > 100  


 


Ceftriaxone Sodium  2 gm in 100 mls @ 200 mls/hr  06/13/21 10:00  06/15/21 09:02





  Rocephin/Ns 2 Gm/100 Ml  IV   200 mls/hr





  [facility]   Administration





  Protocol  


 


Magnesium Hydroxide  30 ml  06/12/21 23:15 





  Magnesium Hydroxide (Mom) Oral Liqd Udc  PO  





  Q4H PRN  





  Constipation  


 


Morphine Sulfate  2 mg  06/12/21 23:15  06/15/21 09:02





  Morphine 2 Mg/1 Ml Inj  IV   2 mg





  Q4H PRN   Administration





  Pain, Moderate (4-6)  


 


Ondansetron HCl  4 mg  06/12/21 23:15 





  Ondansetron 4 Mg/2 Ml Inj  IV  





  Q8H PRN  





  Nausea And Vomiting  


 


Potassium Chloride  40 meq  06/15/21 12:00 





  Potassium Chloride Er 20 Meq Tab  PO  06/15/21 18:01 





  Q6H ARMIDA  


 


Sodium Chloride  10 ml  06/13/21 10:00  06/15/21 09:03





  Sodium Chloride 0.9% 10 Ml Flush Syringe  IV   10 ml





  BID [facility]   Administration


 


Sodium Chloride  10 ml  06/12/21 23:15 





  Sodium Chloride 0.9% 10 Ml Flush Syringe  IV  





  PRN PRN  





  LINE FLUSH  














Nutrition/Malnutrition Assess





- Dietary Evaluation


Nutrition/Malnutrition Findings: 


                                        





Nutrition Notes                                            Start:  06/14/21 

12:10


Freq:                                                      Status: Active       




Protocol:                                                                       




 Document     06/14/21 12:11  DAPHNE  (Rec: 06/14/21 12:13  DAPHNE  [id number])


 Nutrition Notes


     Need for Assessment generated from:         MD Order,Education


     Initial or Follow up                        Brief Note


     Current Diagnosis                           Sepsis,Hypertension,Heart


                                                 Failure,Respiratory Failure


     Other Pertinent Diagnosis                   pneu


     Current Diet                                Cardiac


     Subjective/Other Information                MD consult for diet education.


                                                 Pt reports eating fast food


                                                 regularly and eats high fat,


                                                 high salt foods at home. Pt


                                                 given CHF diet education and


                                                 encouraged to call with any


                                                 questions.


     #1


      Nutrition Diagnosis                        Food and nutrition-related


                                                 knowledge deficit


      Etiology                                   no prior CHF education


      As Evidenced by Signs and Symptoms         pt had questions about high


                                                 salt foods


 Nutrition Intervention


     Teaching Recipient                          Patient


     Learning Readiness                          Good


     Teaching Methods                            Discussion,Handout


     Response to Teaching                        Verbalize understanding


     Education Handouts Provided                 CHF Nutrition Therapy


     Barriers to Learning                        No Barriers


     RD phone number provided                    Yes


     Patient aware of follow up options          Yes


     Revisit per MD consult or patient           Sign Off


      request:
Assessment and Plan


Continue current management.








- Patient Problems


(1) Acute respiratory failure with hypoxia


Current Visit: Yes   Status: Acute   





(2) Bilateral pneumonia


Current Visit: Yes   Status: Acute   





(3) Acute on chronic HFrEF (heart failure with reduced ejection fraction)


Current Visit: Yes   Status: Acute   





(4) Dilated cardiomyopathy


Current Visit: Yes   Status: Chronic   





(5) Hypertension


Current Visit: Yes   Status: Chronic   


Qualifiers: 


   Hypertension type: essential hypertension   Qualified Code(s): I10 - 

Essential (primary) hypertension   





Subjective


Principal diagnosis: Acute Hypoxic Respiratory Failure, PNA, Acute on chronic 

HFrEF


Interval history: 


She feels better.  There is no shortness of breath.








Objective


                                   Vital Signs











  Temp Pulse Resp BP Pulse Ox


 


 06/19/21 08:07      91


 


 06/19/21 00:07  98.1 F  87  20  129/72  89


 


 06/18/21 21:59   92 H   


 


 06/18/21 20:36      97


 


 06/18/21 20:02  98.2 F  93 H  22  121/69  92


 


 06/18/21 16:06  97.7 F  94 H  18  128/78  93


 


 06/18/21 12:05   95 H   131/84  93


 


 06/18/21 10:00    20   97














- Physical Examination


General: No Apparent Distress


HEENT: Positive: EOMI, Normocephaly, Mucus Membranes Moist


Neck: Positive: neck supple, trachea midline.  Negative: JVD/HJR


Cardiac: Positive: Reg Rate and Rhythm, S1/S2


Lungs: Positive: clear to auscultation


Neuro: Positive: Grossly Intact


Abdomen: Positive: Soft, Active Bowel Sounds.  Negative: Tender


Skin: Negative: Rash


Musculoskeletal: Normal Range of Motion


Extremities: Absent: edema





- Labs and Meds


                                       CBC











  06/19/21 Range/Units





  07:52 


 


WBC  12.8 H  (4.5-11.0)  K/mm3


 


RBC  5.18 H  (3.65-5.03)  M/mm3


 


Hgb  12.0  (10.1-14.3)  gm/dl


 


Hct  37.7  D  (30.3-42.9)  %


 


Plt Count  415  (140-440)  K/mm3














- Imaging and Cardiology


Echo: report reviewed





- Telemetry


EKG Rhythm: Sinus Rhythm
Detail Level: Simple
Render Risk Assessment In Note?: no
Additional Notes: Recommended stretching/ massaging the area.